# Patient Record
Sex: FEMALE | Race: WHITE | NOT HISPANIC OR LATINO | Employment: OTHER | URBAN - METROPOLITAN AREA
[De-identification: names, ages, dates, MRNs, and addresses within clinical notes are randomized per-mention and may not be internally consistent; named-entity substitution may affect disease eponyms.]

---

## 2019-05-29 LAB
CHOL/HDLC RATIO: 3.1
CHOLESTEROL, TOTAL: 199
HDLC SERPL-MCNC: 64 MG/DL (ref 35–70)
LDLC SERPL CALC-MCNC: 119 MG/DL
NON HDL CHOL. (LDL+VLDL): 135
TRIGL SERPL-MCNC: 69 MG/DL (ref 40–160)

## 2019-12-26 ENCOUNTER — TELEPHONE (OUTPATIENT)
Dept: PODIATRY | Facility: CLINIC | Age: 84
End: 2019-12-26

## 2019-12-26 ENCOUNTER — OFFICE VISIT (OUTPATIENT)
Dept: URGENT CARE | Facility: CLINIC | Age: 84
End: 2019-12-26
Payer: MEDICARE

## 2019-12-26 VITALS
OXYGEN SATURATION: 99 % | HEART RATE: 92 BPM | TEMPERATURE: 97 F | WEIGHT: 112 LBS | HEIGHT: 63 IN | SYSTOLIC BLOOD PRESSURE: 142 MMHG | RESPIRATION RATE: 16 BRPM | BODY MASS INDEX: 19.84 KG/M2 | DIASTOLIC BLOOD PRESSURE: 85 MMHG

## 2019-12-26 DIAGNOSIS — S81.802A OPEN WOUND OF LEFT LOWER LEG, INITIAL ENCOUNTER: ICD-10-CM

## 2019-12-26 DIAGNOSIS — L03.116 CELLULITIS OF LEFT LOWER EXTREMITY: Primary | ICD-10-CM

## 2019-12-26 PROCEDURE — 99204 PR OFFICE/OUTPT VISIT, NEW, LEVL IV, 45-59 MIN: ICD-10-PCS | Mod: S$GLB,,, | Performed by: PHYSICIAN ASSISTANT

## 2019-12-26 PROCEDURE — 99204 OFFICE O/P NEW MOD 45 MIN: CPT | Mod: S$GLB,,, | Performed by: PHYSICIAN ASSISTANT

## 2019-12-26 RX ORDER — MULTIVITAMIN
1 TABLET ORAL DAILY
COMMUNITY

## 2019-12-26 RX ORDER — DOXYCYCLINE HYCLATE 100 MG
100 TABLET ORAL 2 TIMES DAILY
Qty: 20 TABLET | Refills: 0 | Status: SHIPPED | OUTPATIENT
Start: 2019-12-26 | End: 2020-01-05

## 2019-12-26 NOTE — TELEPHONE ENCOUNTER
----- Message from Diane Martell sent at 12/26/2019  1:33 PM CST -----  Contact: Patient granddaughter Jennie   Type:  Same Day Appointment Request    Caller is requesting a same day appointment.  Caller declined first available appointment listed below.      Name of Caller: Jennie    When is the first available appointment?  1/15/19  Symptoms:Cellulitis of left lower extremity Open wound of left lower leg, initial encounter, Wound Care   Best Call Back Number: [phone]  Additional Information: Jennie is requesting a same day appt 12/26/19 for pt. See referral on chart from Urgent Care.  Please call and advise.

## 2019-12-26 NOTE — TELEPHONE ENCOUNTER
Informed Jennie that we have nothing available today and no Dr tomorrow.  Appointment made at [facility] with Dr Fair for Monday per request.

## 2019-12-26 NOTE — PROGRESS NOTES
"Subjective:       Patient ID: Summer Ch is a 87 y.o. female.    Vitals:  height is 5' 3" (1.6 m) and weight is 50.8 kg (112 lb). Her tympanic temperature is 97 °F (36.1 °C). Her blood pressure is 142/85 (abnormal) and her pulse is 92. Her respiration is 16 and oxygen saturation is 99%.     Chief Complaint: Wound Infection    Patient wound came around two weeks ago.  She has had the shingles and adema.  She was treated for the shingles but is having some other symptoms that have not been treated.  Her granddaughter is concerned this may be a symptom.    Rash   This is a new problem. The current episode started 1 to 4 weeks ago. The affected locations include the left lower leg. The rash is characterized by pain, redness, swelling, scaling and draining. She was exposed to nothing. Pertinent negatives include no cough, fever or sore throat.       Constitution: Negative for chills and fever.   HENT: Negative for facial swelling and sore throat.    Neck: Negative for painful lymph nodes.   Eyes: Negative for eye itching and eyelid swelling.   Respiratory: Negative for cough.    Musculoskeletal: Positive for pain. Negative for joint pain and joint swelling.   Skin: Positive for color change, wound and abscess. Negative for pale, rash, abrasion, laceration, lesion, skin thickening/induration, puncture wound, erythema, bruising, avulsion and hives.   Allergic/Immunologic: Negative for environmental allergies, immunocompromised state and hives.   Hematologic/Lymphatic: Negative for swollen lymph nodes.       Objective:          Physical Exam   Constitutional: She is oriented to person, place, and time. She appears well-developed and well-nourished. She is cooperative.  Non-toxic appearance. She does not appear ill. No distress.   HENT:   Head: Normocephalic and atraumatic.   Right Ear: Hearing, tympanic membrane, external ear and ear canal normal.   Left Ear: Hearing, tympanic membrane, external ear and ear canal " normal.   Nose: Nose normal. No mucosal edema, rhinorrhea or nasal deformity. No epistaxis. Right sinus exhibits no maxillary sinus tenderness and no frontal sinus tenderness. Left sinus exhibits no maxillary sinus tenderness and no frontal sinus tenderness.   Mouth/Throat: Uvula is midline, oropharynx is clear and moist and mucous membranes are normal. No trismus in the jaw. Normal dentition. No uvula swelling. No posterior oropharyngeal erythema.   Eyes: Conjunctivae and lids are normal. Right eye exhibits no discharge. Left eye exhibits no discharge. No scleral icterus.   Neck: Trachea normal, normal range of motion, full passive range of motion without pain and phonation normal. Neck supple.   Cardiovascular: Normal rate, regular rhythm, normal heart sounds, intact distal pulses and normal pulses.   Pulmonary/Chest: Effort normal and breath sounds normal. No respiratory distress.   Abdominal: Soft. Normal appearance and bowel sounds are normal. She exhibits no distension, no pulsatile midline mass and no mass. There is no tenderness.   Musculoskeletal: Normal range of motion. She exhibits no edema or deformity.        Left lower leg: She exhibits tenderness and swelling (with cellulitis and open wound (see image) ).        Legs:  Neurological: She is alert and oriented to person, place, and time. She exhibits normal muscle tone. Coordination normal.   Skin: Skin is warm, dry, intact, not diaphoretic and not pale. erythema  Psychiatric: She has a normal mood and affect. Her speech is normal and behavior is normal. Judgment and thought content normal. Cognition and memory are normal.   Nursing note and vitals reviewed.        Assessment:       1. Cellulitis of left lower extremity    2. Open wound of left lower leg, initial encounter        Plan:         Cellulitis of left lower extremity  -     doxycycline (VIBRA-TABS) 100 MG tablet; Take 1 tablet (100 mg total) by mouth 2 (two) times daily. Take as directed  until bottle is empty for 10 days  Dispense: 20 tablet; Refill: 0  -     Ambulatory referral to Wound Clinic    Open wound of left lower leg, initial encounter  -     doxycycline (VIBRA-TABS) 100 MG tablet; Take 1 tablet (100 mg total) by mouth 2 (two) times daily. Take as directed until bottle is empty for 10 days  Dispense: 20 tablet; Refill: 0  -     Ambulatory referral to Wound Clinic    to wound care. Grand daughter in medical school, offering close observation until new PCP appointment. To ED with worsening symptoms.     Patient Instructions     Cellulitis  Cellulitis is an infection of the deep layers of skin. A break in the skin, such as a cut or scratch, can let bacteria under the skin. If the bacteria get to deep layers of the skin, it can be serious. If not treated, cellulitis can get into the bloodstream and lymph nodes. The infection can then spread throughout the body. This causes serious illness.  Cellulitis causes the affected skin to become red, swollen, warm, and sore. The reddened areas have a visible border. An open sore may leak fluid (pus). You may have a fever, chills, and pain.  Cellulitis is treated with antibiotics taken for 7 to 10 days. An open sore may be cleaned and covered with cool wet gauze. Symptoms should get better 1 to 2 days after treatment is started. Make sure to take all the antibiotics for the full number of days until they are gone. Keep taking the medicine even if your symptoms go away.  Home care  Follow these tips:  · Limit the use of the part of your body with cellulitis.   · If the infection is on your leg, keep your leg raised while sitting. This will help to reduce swelling.  · Take all of the antibiotic medicine exactly as directed until it is gone. Do not miss any doses, especially during the first 7 days. Dont stop taking the medicine when your symptoms get better.  · Keep the affected area clean and dry.  · Wash your hands with soap and warm water before and  after touching your skin. Anyone else who touches your skin should also wash his or her hands. Don't share towels.  Follow-up care  Follow up with your healthcare provider, or as advised. If your infection does not go away on the first antibiotic, your healthcare provider will prescribe a different one.  When to seek medical advice  Call your healthcare provider right away if any of these occur:  · Red areas that spread  · Swelling or pain that gets worse  · Fluid leaking from the skin (pus)  · Fever higher of 100.4º F (38.0º C) or higher after 2 days on antibiotics  Date Last Reviewed: 9/1/2016 © 2000-2017 [employer]. [address] [place] [address], [place] [address]. All rights reserved. This information is not intended as a substitute for professional medical care. Always follow your healthcare professional's instructions.       If not allergic,take tylenol (acetominophen) for fever control, chills, or body aches every 4 hours. Do not exceed 4000 mg/ day.If not allergic, take Motrin (Ibuprofen) every 4 hours for fever, chills, pain or inflammation. Do not exceed 2400 mg/day. You can alternate taking tylenol and motrin.    If you were prescribed a narcotic medication, do not drive or operate heavy equipment or machinery while taking these medications.  You must understand that you've received an Urgent Care treatment only and that you may be released before all your medical problems are known or treated. You, the patient, will arrange for follow up care as instructed.  Follow up with your PCP or specialty clinic as directed in the next 1-2 weeks if not improved or as needed.  You can call [phone] to schedule an appointment with the appropriate provider.  If your condition worsens we recommend that you receive another evaluation at the emergency room immediately or contact your primary medical clinics after hours call service to discuss your concerns.    Please return here or go to the Emergency  Department for any concerns or worsening of condition.    If you have been referred to another provider and wish to call to check on the status of your referral, please call [facility] Scheduling at [phone]

## 2019-12-29 ENCOUNTER — TELEPHONE (OUTPATIENT)
Dept: URGENT CARE | Facility: CLINIC | Age: 84
End: 2019-12-29

## 2019-12-29 NOTE — TELEPHONE ENCOUNTER
Patient states her leg is doing a little better.  She is going to see her new PCP tomorrow and she has another appointment on Tuesday

## 2019-12-30 ENCOUNTER — OFFICE VISIT (OUTPATIENT)
Dept: PODIATRY | Facility: CLINIC | Age: 84
End: 2019-12-30
Payer: MEDICARE

## 2019-12-30 VITALS
BODY MASS INDEX: 19.84 KG/M2 | DIASTOLIC BLOOD PRESSURE: 68 MMHG | SYSTOLIC BLOOD PRESSURE: 138 MMHG | WEIGHT: 112 LBS | HEIGHT: 63 IN

## 2019-12-30 DIAGNOSIS — L97.921 ULCER OF LEFT LOWER LEG, LIMITED TO BREAKDOWN OF SKIN: Primary | ICD-10-CM

## 2019-12-30 DIAGNOSIS — I83.222 VARICOSE VEINS OF LEFT LOWER EXTREMITY WITH INFLAMMATION, WITH ULCER OF CALF LIMITED TO BREAKDOWN OF SKIN: ICD-10-CM

## 2019-12-30 DIAGNOSIS — L97.221 VARICOSE VEINS OF LEFT LOWER EXTREMITY WITH INFLAMMATION, WITH ULCER OF CALF LIMITED TO BREAKDOWN OF SKIN: ICD-10-CM

## 2019-12-30 PROCEDURE — 99203 OFFICE O/P NEW LOW 30 MIN: CPT | Mod: S$GLB,,, | Performed by: PODIATRIST

## 2019-12-30 PROCEDURE — 1101F PR PT FALLS ASSESS DOC 0-1 FALLS W/OUT INJ PAST YR: ICD-10-PCS | Mod: S$GLB,,, | Performed by: PODIATRIST

## 2019-12-30 PROCEDURE — 1101F PT FALLS ASSESS-DOCD LE1/YR: CPT | Mod: S$GLB,,, | Performed by: PODIATRIST

## 2019-12-30 PROCEDURE — 1159F PR MEDICATION LIST DOCUMENTED IN MEDICAL RECORD: ICD-10-PCS | Mod: S$GLB,,, | Performed by: PODIATRIST

## 2019-12-30 PROCEDURE — 1125F AMNT PAIN NOTED PAIN PRSNT: CPT | Mod: S$GLB,,, | Performed by: PODIATRIST

## 2019-12-30 PROCEDURE — 99203 PR OFFICE/OUTPT VISIT, NEW, LEVL III, 30-44 MIN: ICD-10-PCS | Mod: S$GLB,,, | Performed by: PODIATRIST

## 2019-12-30 PROCEDURE — 1159F MED LIST DOCD IN RCRD: CPT | Mod: S$GLB,,, | Performed by: PODIATRIST

## 2019-12-30 PROCEDURE — 1125F PR PAIN SEVERITY QUANTIFIED, PAIN PRESENT: ICD-10-PCS | Mod: S$GLB,,, | Performed by: PODIATRIST

## 2019-12-30 RX ORDER — FUROSEMIDE 20 MG/1
TABLET ORAL
Refills: 3 | COMMUNITY
Start: 2019-12-02 | End: 2019-12-31

## 2019-12-30 RX ORDER — GABAPENTIN 100 MG/1
CAPSULE ORAL
COMMUNITY
Start: 2019-12-26 | End: 2020-01-27 | Stop reason: SDUPTHER

## 2019-12-30 RX ORDER — DORZOLAMIDE HCL 20 MG/ML
1 SOLUTION/ DROPS OPHTHALMIC 3 TIMES DAILY
Refills: 6 | COMMUNITY
Start: 2019-11-19

## 2019-12-30 RX ORDER — BIMATOPROST 0.1 MG/ML
SOLUTION/ DROPS OPHTHALMIC
COMMUNITY
Start: 2019-11-30

## 2019-12-30 RX ORDER — ACYCLOVIR 800 MG/1
TABLET ORAL
Refills: 2 | COMMUNITY
Start: 2019-10-22 | End: 2019-12-31

## 2019-12-30 NOTE — LETTER
December 30, 2019      Jayden Minor PA-C  [address] [facility] [address]           [facility] Podiat[facility]  [address]  [facility] [address]  Phone: [phone]  Fax: [phone]          Patient: Summer Ch   MR Number: [id number]   YOB: 1932   Date of Visit: 12/30/2019       Dear Jayden Minor:    Thank you for referring Summer Ch to me for evaluation. Attached you will find relevant portions of my assessment and plan of care.    If you have questions, please do not hesitate to call me. I look forward to following Summer Ch along with you.    Sincerely,    Eugene Fair, BYRON    Enclosure  CC:  No Recipients    If you would like to receive this communication electronically, please contact [email or web address] or [phone] to request more information on [employer] Link access.    For providers and/or their staff who would like to refer a patient to [facility], please contact us through our one-stop-shop provider referral line, [facility], at [phone].    If you feel you have received this communication in error or would no longer like to receive these types of communications, please e-mail [email or web address]

## 2019-12-30 NOTE — PROGRESS NOTES
[address] [place] [address]  Phone: [phone]   [phone]    Patient's PCP:Leonila Sam MD (Inactive)  Referring Provider: Jayden Minor    Subjective:      Chief Complaint:: Wound Check (lower left leg)    NAWAF Ch is a 87 y.o. female who presents with a complaint of wound to left lower limb lasting for several weeks. Onset of the symptoms was pain.  Current symptoms include open wound, scabbing, inflamation.  Aggravating factors are touching. Symptoms have decreased.Treatment to date have included Antibiotics, daily dressing changes,  Patients rates pain 4/10 on pain scale.      Vitals:    12/30/19 1041   BP: 138/68     Shoe Size: 7    History reviewed. No pertinent surgical history.  History reviewed. No pertinent past medical history.  History reviewed. No pertinent family history.     Social History:   Marital Status:   Alcohol History:  reports that she drank alcohol.  Tobacco History:  reports that she has never smoked. She does not have any smokeless tobacco history on file.  Drug History:  reports that she has current or past drug history.    Review of patient's allergies indicates:  No Known Allergies    Current Outpatient Medications   Medication Sig Dispense Refill    acyclovir (ZOVIRAX) 800 MG Tab   2    collagenase (SANTYL) ointment Apply topically once daily. 90 g 2    dorzolamide (TRUSOPT) 2 % ophthalmic solution   6    doxycycline (VIBRA-TABS) 100 MG tablet Take 1 tablet (100 mg total) by mouth 2 (two) times daily. Take as directed until bottle is empty for 10 days 20 tablet 0    furosemide (LASIX) 20 MG tablet   3    gabapentin (NEURONTIN) 100 MG capsule       LUMIGAN 0.01 % Drop       multivitamin (THERAGRAN) per tablet Take 1 tablet by mouth once daily.       No current facility-administered medications for this visit.        Review of Systems      Objective:        Physical Exam:   Foot Exam    General  General Appearance:  appears stated age and healthy   Orientation: alert and oriented to person, place, and time   Affect: appropriate   Gait: antalgic       Left Foot/Ankle      Inspection and Palpation  Skin Exam: ulcer (Grade 2 superficial venous stasis ulcers x3 posterior left lower leg.  No signs of infection.);     Neurovascular  Dorsalis pedis: 1+  Posterior tibial: 1+          Physical Exam   Cardiovascular:   Pulses:       Dorsalis pedis pulses are 1+ on the left side.        Posterior tibial pulses are 1+ on the left side.   Musculoskeletal:        Legs:  Feet:   Left Foot:   Skin Integrity: Positive for ulcer (Grade 2 superficial venous stasis ulcers x3 posterior left lower leg.  No signs of infection.).       Imaging:            Assessment:       1. Ulcer of left lower leg, limited to breakdown of skin    2. 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  Dispense: 90 g; Refill: 2  -     collagenase (SANTYL) ointment; Apply topically once daily.  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    I discussed venous incompetency and sequela of edema, skin pigmentation with hemosiderin deposits, potential ulcer formation.  I discussed compression hose, elevation and possible vascular consult.    Patient's family's ask in for referral to wound care clinic in the [facility].  I am referring her to Dr. Walker  At the  [facility] wound care center and I am giving her the name of Dr. Wellington Reid.  She return to see me as  needed.  Follow up for Pt will f/.u with .    Procedures - None    Counseling:     I provided patient education verbally regarding:   Patient diagnosis, treatment options, as well as alternatives, risks, and benefits.     This note was created using Dragon voice recognition software that occasionally misinterpreted phrases or words.

## 2019-12-31 ENCOUNTER — LAB VISIT (OUTPATIENT)
Dept: LAB | Facility: HOSPITAL | Age: 84
End: 2019-12-31
Attending: INTERNAL MEDICINE
Payer: MEDICARE

## 2019-12-31 ENCOUNTER — OFFICE VISIT (OUTPATIENT)
Dept: FAMILY MEDICINE | Facility: CLINIC | Age: 84
End: 2019-12-31
Payer: MEDICARE

## 2019-12-31 VITALS
HEART RATE: 70 BPM | RESPIRATION RATE: 16 BRPM | WEIGHT: 99.63 LBS | SYSTOLIC BLOOD PRESSURE: 152 MMHG | HEIGHT: 63 IN | TEMPERATURE: 99 F | BODY MASS INDEX: 17.65 KG/M2 | DIASTOLIC BLOOD PRESSURE: 68 MMHG

## 2019-12-31 DIAGNOSIS — H49.9: ICD-10-CM

## 2019-12-31 DIAGNOSIS — G93.9 LESION OF BRAIN: Primary | ICD-10-CM

## 2019-12-31 DIAGNOSIS — I49.9 IRREGULAR HEART RHYTHM: ICD-10-CM

## 2019-12-31 DIAGNOSIS — L98.9 NON-HEALING SKIN LESION: ICD-10-CM

## 2019-12-31 DIAGNOSIS — G93.9 LESION OF BRAIN: ICD-10-CM

## 2019-12-31 DIAGNOSIS — R60.0 BILATERAL LOWER EXTREMITY EDEMA: ICD-10-CM

## 2019-12-31 DIAGNOSIS — Z85.828 HISTORY OF BASAL CELL CARCINOMA: ICD-10-CM

## 2019-12-31 DIAGNOSIS — I83.222 VARICOSE VEINS OF LEFT LOWER EXTREMITY WITH INFLAMMATION, WITH ULCER OF CALF LIMITED TO BREAKDOWN OF SKIN: ICD-10-CM

## 2019-12-31 DIAGNOSIS — R03.0 ELEVATED BP WITHOUT DIAGNOSIS OF HYPERTENSION: ICD-10-CM

## 2019-12-31 DIAGNOSIS — L97.221 VARICOSE VEINS OF LEFT LOWER EXTREMITY WITH INFLAMMATION, WITH ULCER OF CALF LIMITED TO BREAKDOWN OF SKIN: ICD-10-CM

## 2019-12-31 DIAGNOSIS — L97.921 LEG ULCER, LEFT, LIMITED TO BREAKDOWN OF SKIN: ICD-10-CM

## 2019-12-31 DIAGNOSIS — D48.7 NEOPLASM OF UNCERTAIN BEHAVIOR OF OTHER SPECIFIED SITES: ICD-10-CM

## 2019-12-31 LAB
ALBUMIN SERPL BCP-MCNC: 3.9 G/DL (ref 3.5–5.2)
ALP SERPL-CCNC: 83 U/L (ref 55–135)
ALT SERPL W/O P-5'-P-CCNC: 17 U/L (ref 10–44)
ANION GAP SERPL CALC-SCNC: 7 MMOL/L (ref 8–16)
AST SERPL-CCNC: 21 U/L (ref 10–40)
BASOPHILS # BLD AUTO: 0.08 K/UL (ref 0–0.2)
BASOPHILS NFR BLD: 0.9 % (ref 0–1.9)
BILIRUB SERPL-MCNC: 0.4 MG/DL (ref 0.1–1)
BUN SERPL-MCNC: 14 MG/DL (ref 8–23)
CALCIUM SERPL-MCNC: 9.4 MG/DL (ref 8.7–10.5)
CHLORIDE SERPL-SCNC: 106 MMOL/L (ref 95–110)
CO2 SERPL-SCNC: 27 MMOL/L (ref 23–29)
CREAT SERPL-MCNC: 0.8 MG/DL (ref 0.5–1.4)
CRP SERPL-MCNC: 0.4 MG/L (ref 0–8.2)
DIFFERENTIAL METHOD: ABNORMAL
EOSINOPHIL # BLD AUTO: 0.3 K/UL (ref 0–0.5)
EOSINOPHIL NFR BLD: 3.7 % (ref 0–8)
ERYTHROCYTE [DISTWIDTH] IN BLOOD BY AUTOMATED COUNT: 12.6 % (ref 11.5–14.5)
ERYTHROCYTE [SEDIMENTATION RATE] IN BLOOD BY WESTERGREN METHOD: 13 MM/HR (ref 0–36)
EST. GFR  (AFRICAN AMERICAN): >60 ML/MIN/1.73 M^2
EST. GFR  (NON AFRICAN AMERICAN): >60 ML/MIN/1.73 M^2
GLUCOSE SERPL-MCNC: 95 MG/DL (ref 70–110)
HCT VFR BLD AUTO: 41.5 % (ref 37–48.5)
HGB BLD-MCNC: 12.6 G/DL (ref 12–16)
IMM GRANULOCYTES # BLD AUTO: 0.02 K/UL (ref 0–0.04)
IMM GRANULOCYTES NFR BLD AUTO: 0.2 % (ref 0–0.5)
LYMPHOCYTES # BLD AUTO: 2.1 K/UL (ref 1–4.8)
LYMPHOCYTES NFR BLD: 22.5 % (ref 18–48)
MCH RBC QN AUTO: 30.8 PG (ref 27–31)
MCHC RBC AUTO-ENTMCNC: 30.4 G/DL (ref 32–36)
MCV RBC AUTO: 102 FL (ref 82–98)
MONOCYTES # BLD AUTO: 0.8 K/UL (ref 0.3–1)
MONOCYTES NFR BLD: 8.2 % (ref 4–15)
NEUTROPHILS # BLD AUTO: 5.9 K/UL (ref 1.8–7.7)
NEUTROPHILS NFR BLD: 64.5 % (ref 38–73)
NRBC BLD-RTO: 0 /100 WBC
PATH REV BLD -IMP: NORMAL
PLATELET # BLD AUTO: 327 K/UL (ref 150–350)
PMV BLD AUTO: 9.4 FL (ref 9.2–12.9)
POTASSIUM SERPL-SCNC: 4.5 MMOL/L (ref 3.5–5.1)
PROT SERPL-MCNC: 7.1 G/DL (ref 6–8.4)
RBC # BLD AUTO: 4.09 M/UL (ref 4–5.4)
SODIUM SERPL-SCNC: 140 MMOL/L (ref 136–145)
WBC # BLD AUTO: 9.16 K/UL (ref 3.9–12.7)

## 2019-12-31 PROCEDURE — 93010 ELECTROCARDIOGRAM REPORT: CPT | Mod: S$GLB,,, | Performed by: INTERNAL MEDICINE

## 2019-12-31 PROCEDURE — 36415 COLL VENOUS BLD VENIPUNCTURE: CPT | Mod: PN

## 2019-12-31 PROCEDURE — 99999 PR PBB SHADOW E&M-EST. PATIENT-LVL V: ICD-10-PCS | Mod: PBBFAC,,, | Performed by: INTERNAL MEDICINE

## 2019-12-31 PROCEDURE — 1126F PR PAIN SEVERITY QUANTIFIED, NO PAIN PRESENT: ICD-10-PCS | Mod: S$GLB,,, | Performed by: INTERNAL MEDICINE

## 2019-12-31 PROCEDURE — 1159F MED LIST DOCD IN RCRD: CPT | Mod: S$GLB,,, | Performed by: INTERNAL MEDICINE

## 2019-12-31 PROCEDURE — 85652 RBC SED RATE AUTOMATED: CPT

## 2019-12-31 PROCEDURE — 99499 RISK ADDL DX/OHS AUDIT: ICD-10-PCS | Mod: S$GLB,,, | Performed by: INTERNAL MEDICINE

## 2019-12-31 PROCEDURE — 85060 PATHOLOGIST REVIEW: ICD-10-PCS | Mod: ,,, | Performed by: PATHOLOGY

## 2019-12-31 PROCEDURE — 99205 OFFICE O/P NEW HI 60 MIN: CPT | Mod: S$GLB,,, | Performed by: INTERNAL MEDICINE

## 2019-12-31 PROCEDURE — 86140 C-REACTIVE PROTEIN: CPT

## 2019-12-31 PROCEDURE — 99999 PR PBB SHADOW E&M-EST. PATIENT-LVL V: CPT | Mod: PBBFAC,,, | Performed by: INTERNAL MEDICINE

## 2019-12-31 PROCEDURE — 80053 COMPREHEN METABOLIC PANEL: CPT

## 2019-12-31 PROCEDURE — 93005 ELECTROCARDIOGRAM TRACING: CPT | Mod: S$GLB,,, | Performed by: INTERNAL MEDICINE

## 2019-12-31 PROCEDURE — 1101F PT FALLS ASSESS-DOCD LE1/YR: CPT | Mod: CPTII,S$GLB,, | Performed by: INTERNAL MEDICINE

## 2019-12-31 PROCEDURE — 1126F AMNT PAIN NOTED NONE PRSNT: CPT | Mod: S$GLB,,, | Performed by: INTERNAL MEDICINE

## 2019-12-31 PROCEDURE — 1159F PR MEDICATION LIST DOCUMENTED IN MEDICAL RECORD: ICD-10-PCS | Mod: S$GLB,,, | Performed by: INTERNAL MEDICINE

## 2019-12-31 PROCEDURE — 99205 PR OFFICE/OUTPT VISIT, NEW, LEVL V, 60-74 MIN: ICD-10-PCS | Mod: S$GLB,,, | Performed by: INTERNAL MEDICINE

## 2019-12-31 PROCEDURE — 85060 BLOOD SMEAR INTERPRETATION: CPT | Mod: ,,, | Performed by: PATHOLOGY

## 2019-12-31 PROCEDURE — 93010 EKG 12-LEAD: ICD-10-PCS | Mod: S$GLB,,, | Performed by: INTERNAL MEDICINE

## 2019-12-31 PROCEDURE — 99499 UNLISTED E&M SERVICE: CPT | Mod: S$GLB,,, | Performed by: INTERNAL MEDICINE

## 2019-12-31 PROCEDURE — 1101F PR PT FALLS ASSESS DOC 0-1 FALLS W/OUT INJ PAST YR: ICD-10-PCS | Mod: CPTII,S$GLB,, | Performed by: INTERNAL MEDICINE

## 2019-12-31 PROCEDURE — 85025 COMPLETE CBC W/AUTO DIFF WBC: CPT

## 2019-12-31 PROCEDURE — 93005 EKG 12-LEAD: ICD-10-PCS | Mod: S$GLB,,, | Performed by: INTERNAL MEDICINE

## 2019-12-31 RX ORDER — TIMOLOL MALEATE 5 MG/ML
1 SOLUTION/ DROPS OPHTHALMIC DAILY
Refills: 6 | COMMUNITY
Start: 2019-11-30

## 2019-12-31 NOTE — PROGRESS NOTES
"Assessment and Plan:    1. Lesion of brain  - Ambulatory referral to Neurosurgery  - CBC auto differential; Future  - Comprehensive metabolic panel; Future  - Sedimentation rate; Future  - C-reactive protein; Future  - Pathologist Interpretation Differential; Future  2. 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 Yes; Future  3. Eye muscle paralysis, left    Patient presenting with history of questionable diagnosis of shingles and ptosis/eye muscle paralysis of unclear duration but at least present for the last month. Noted on recent MRI to have "Insinuating avidly enhancing lesion within the left cavernous sinus and Meckel's cave extending through the left orbital apex into the poster aspect of the orbit. Main differential considerations include inflammatory process such as idiopathic orbital inflammation/Arlette Haas syndrome perineural spread of metastatic disease or lymphoma.  Comparison with prior imaging if available would be helpful."    This MRI result had not previously been discussed with patient or family by ordering provider. I personally left message with Dr. Carias's office today to discuss result. I discussed her symptoms and MRI result with Neurologist during this visit. Discussed possible steroids if this is Arlette Haas syndrome, but would hold off awaiting possible need for biopsy. Case will be discussed with [facility], patient advised to call us if they had not heard within a week.     Will start additional workup with labs as above. No known history of malignancy elsewhere for this to be metastasis, but has not really had any cancer screenings recently. Will await additional workup pending Neuro/Oncology evaluation.     4. Leg ulcer, left, limited to breakdown of skin  5. Varicose veins of left lower extremity with inflammation, with ulcer of calf limited to breakdown of skin  Wound care per LPN in clinic today. Has scheduled apt with  " Sandip later this week. Workup of edema as below.     6. Bilateral lower extremity edema  Noted to have bilateral LE edema in the last year, recently diagnosed with venous stasis ulcer. No known cause of bilateral edema, but states this was never worked up. No known renal disease, but not clear if this has been monitored. No known hepatic disease, but history of alcohol dependence so this is possible. No known heart disease, but at 87 with elevated systolic BP diastolic dysfunction is likely. Will obtain echo. Possible it is just venous insufficiency, but will evaluate for other causes. Follow up with me and Cardiology after echo.  - Echo Color Flow Doppler? Yes; Future  - Ambulatory referral to Cardiology    7. Irregular heart rhythm  On exam noted to have irregularly irregular rhythm. Per patient and family, had never been diagnosed with A-fib or any other arrhythmia. On EKG in clinic, she is not in A-fib, rather sinus with significant arrhythmia. No symptoms related to this, was incidentally noted. Will obtain echo as planned to evaluate for causes of bilateral LE edema and will have patient see Cardiology (especially with possible need for treatment related to brain lesion).   - IN OFFICE EKG 12-LEAD (to Muse)  - Echo Color Flow Doppler? Yes; Future  - Ambulatory referral to Cardiology    8. Elevated BP without diagnosis of hypertension  Elevated today and borderline elevated on previous visits. No previous diagnosis of HTN and never on medications, but family reports concern that previous PCP may not have been closely monitoring this. Will readdress at future visits once we have more information.  - Comprehensive metabolic panel; Future  - Echo Color Flow Doppler? Yes; Future  - Ambulatory referral to Cardiology    9. History of basal cell carcinoma  10. Non-healing skin lesion  History of numerous basal cell carcinomas removed reportedly by Dr. Lemon. Has never seen Dermatology. Has non-healing ulcer on  left side of face as pictured. Discussed that we will eventually need her to see Dermatology, but this can wait for now.     Visit time 11:20-1:15 (115 minutes) and additional time for charting with >50% of this time spend in direct discussion, consultation, and review. Multiple diagnoses including plan of care discussed with patient and family. Multiple questions asked and answered. Chart reviewed in full. Outside MRI result reviewed and discussed with patient/family. EKG interpreted in clinic and follow up plan documented. Lab work ordered. All medications reviewed and addressed.   ______________________________________________________________________  Subjective:    Chief Complaint:  Establish care, multiple complaints.    HPI:  Summer is a 87 y.o. year old woman here to establish care. She is new to [facility], she is a former patient of Dr. Chris Lemon.    She is here today accompanied by her daughter Delicia and granddaughter Ashley (MS1 at [facility] [employer]).     One of the concerns they wanted to discuss was that been recently diagnosed with shingles, but they are not sure if this is the whole story. She had first noticed left sided upper facial pain in July. She ended up having a biopsy of a facial lesion (not sure if this was rash vs a different lesion) sometime in July, told it was not cancer, but they do not know if they were told anything else about the biopsy. Several months ago she started to notice drooping of her left eyelid, so went to see her optometrist, who had referred her to Dr. Carias. She is not sure of what all was done initially, at some point was put on acyclovir which she is no longer taking, and at some point she was put on low dose gabapentin which she is still taking, but not sure if this is helping. On 12/5, Dr. Carias ordered MRI brain and orbit to work up sixth nerve palsy and partial third nerve palsy, but this was not completed until 12/27 due to waiting on  insurance coverage per patient report. They have not yet received any results from this yet. At this point she reports that she is unable to open her left eyelid or move her left eye. She states she has some minimal pain in area of left upper face, but not clear if this is the skin or pain deeper down in the orbital area.    Family reports that she has had some minor cognitive decline in the last year, but nothing abrupt. They are not sure if this is mainly due to hearing loss.    She has also recently been dealing with ulcer of left leg. She first noticed swelling in both legs roughly a year ago after she had the fall with tibial plateau fracture. She was initially diagnosed with cellulitis of left lower extremity at urgent care visit on 12/26, treated with doxycycline. States she had not noticed any ulcer on her leg prior to this time. She was subsequently seen by podiatry yesterday for left leg ulcer and was referred to wound care. Has scheduled wound care apt.     She reports that she was always a healthy person, very active, but had a fall about 1 year ago with a tibia fracture and her health has gone downhill since then. She was inactive for 2 months at the time of this fracture. She reportedly had no leg swelling prior to this fall.     She has also been diagnosed with basal cell carcinoma several times since 2009. One large lesion on her back, at least 5 times total has had various BCC and not sure where all of them were. Reports that every time this was diagnosed and treated by Dr. Lemon, has not ever seen a dermatologist.     She reports that she used to be a heavy alcohol user, but has not had any alcohol since ~1999. No history of known complications from this such as liver disease.     Past Medical History:  Past Medical History:   Diagnosis Date    Cancer     skin cancer       Past Surgical History:  Past Surgical History:   Procedure Laterality Date    APPENDECTOMY      HYSTERECTOMY      SKIN  CANCER EXCISION      x5    TONSILLECTOMY         Family History:  Family History   Problem Relation Age of Onset    Alcohol abuse Mother     Stroke Father         Hemorrhagic       Social History:  Social History     Socioeconomic History    Marital status:      Spouse name: Not on file    Number of children: Not on file    Years of education: Not on file    Highest education level: Not on file   Occupational History    Not on file   Social Needs    Financial resource strain: Not on file    Food insecurity:     Worry: Not on file     Inability: Not on file    Transportation needs:     Medical: Not on file     Non-medical: Not on file   Tobacco Use    Smoking status: Never Smoker    Smokeless tobacco: Never Used   Substance and Sexual Activity    Alcohol use: Not Currently    Drug use: Not Currently    Sexual activity: Not Currently   Lifestyle    Physical activity:     Days per week: Not on file     Minutes per session: Not on file    Stress: Not on file   Relationships    Social connections:     Talks on phone: Not on file     Gets together: Not on file     Attends [employer] service: Not on file     Active member of club or organization: Not on file     Attends meetings of clubs or organizations: Not on file     Relationship status: Not on file   Other Topics Concern    Not on file   Social History Narrative    Not on file       Medications:  Current Outpatient Medications on File Prior to Visit   Medication Sig Dispense Refill    acetaminophen (TYLENOL ORAL) Take 1 Dose by mouth 2 (two) times daily as needed.      dorzolamide (TRUSOPT) 2 % ophthalmic solution   6    doxycycline (VIBRA-TABS) 100 MG tablet Take 1 tablet (100 mg total) by mouth 2 (two) times daily. Take as directed until bottle is empty for 10 days 20 tablet 0    gabapentin (NEURONTIN) 100 MG capsule       LUMIGAN 0.01 % Drop       multivitamin (THERAGRAN) per tablet Take 1 tablet by mouth once daily.       "collagenase (SANTYL) ointment Apply topically once daily. (Patient not taking: Reported on 12/31/2019) 90 g 2    timolol maleate 0.5% (TIMOPTIC) 0.5 % Drop   6    [DISCONTINUED] acyclovir (ZOVIRAX) 800 MG Tab   2    [DISCONTINUED] furosemide (LASIX) 20 MG tablet   3     No current facility-administered medications on file prior to visit.        Allergies:  Patient has no known allergies.    Immunizations:    There is no immunization history on file for this patient.    Review of Systems:  Review of Systems   Constitutional: Negative for activity change, appetite change, chills and fever.   HENT: Positive for hearing loss. Negative for trouble swallowing.    Eyes: Positive for visual disturbance. Negative for photophobia and discharge.   Respiratory: Negative for chest tightness and shortness of breath.    Cardiovascular: Positive for leg swelling. Negative for chest pain and palpitations.   Gastrointestinal: Negative for nausea and vomiting.   Endocrine: Negative for polydipsia and polyuria.   Genitourinary: Negative for difficulty urinating and frequency.   Musculoskeletal: Negative for gait problem and myalgias.   Skin: Positive for color change, rash and wound.   Neurological: Positive for facial asymmetry and headaches. Negative for dizziness, seizures, syncope, weakness and light-headedness.   Hematological: Negative for adenopathy. Does not bruise/bleed easily.   Psychiatric/Behavioral: Negative for dysphoric mood. The patient is not nervous/anxious.        Objective:    Vitals:  Vitals:    12/31/19 1132 12/31/19 1235   BP: (!) 162/70 (!) 152/68   Pulse: 70    Resp: 16    Temp: 98.7 °F (37.1 °C)    TempSrc: Oral    Weight: 45.2 kg (99 lb 10.4 oz)    Height: 5' 3" (1.6 m)    PainSc: 0-No pain        Physical Exam   Constitutional: She is oriented to person, place, and time. She appears well-developed and well-nourished. No distress.   HENT:   Mouth/Throat: Oropharynx is clear and moist.   Eyes:   right eye " has normal eye movement and normally reactive to light; left eye has complete ptosis (unable to hold eyelid open at all) and no medial or lateral movement of left eye   Neck: Normal range of motion. No JVD present. No thyromegaly present.   Cardiovascular: Normal rate and intact distal pulses. Exam reveals no gallop and no friction rub.   Murmur (faint systolic murmur at base of heart) heard.  normal rate but irregularly irregular rhythm   Pulmonary/Chest: Effort normal and breath sounds normal. No respiratory distress. She has no wheezes. She has no rales.   Abdominal: Soft. She exhibits no distension.   Musculoskeletal:   1+ pitting edema to bilateral lower extremities up to mid shin, equal bilaterally   Neurological: She is alert and oriented to person, place, and time. A cranial nerve deficit is present.   Skin: Skin is warm and dry. She is not diaphoretic.   3 shallow ulcerations noted to left calf with no surrouding erythema, minimal clear drainage   Psychiatric: She has a normal mood and affect. Her behavior is normal. Judgment and thought content normal.                Data:    MRI Brain/Orbit 12/27/19    Impression       Insinuating avidly enhancing lesion within the left cavernous sinus and Meckel's cave extending through the left orbital apex into the poster aspect of the orbit.  It extends along cranial nerve V2 in the infraorbital foramen with enhancing tissue in the infraorbital soft tissues anterior to the left maxillary sinus.  It also extends along cranial nerve V3 through foramen ovale into the left infratemporal fossa.  Main differential considerations include inflammatory process such as idiopathic orbital inflammation/Arlette Haas syndrome perineural spread of metastatic disease or lymphoma.  Comparison with prior imaging if available would be helpful.  Interval follow-up imaging may be helpful to assess change/response to any treatments.     EKG in clinic (my interpretation): Sinus rhythm with  significant arrhythmia but definite P waves, no ST or T wave changes    Kayla Pereira MD  Internal Medicine

## 2019-12-31 NOTE — PROGRESS NOTES
Pt confirmed, name and . Ulcer x 3, L posterior LE. No sign of infection. Pos for very minimal draining, clear serous fluid. Applied triple antibiotic ointment, 4x4 adaptci and sterile 4x4 gauze. Covered with 4 in roll gauze. Dressing applied is just to keep wound covered until pt gets home or until tomorrow, if pt prefers. Pt and family will continue to dress wound as instructed by Dr Fair until pt sees wound care on Friday.

## 2020-01-02 ENCOUNTER — TELEPHONE (OUTPATIENT)
Dept: URGENT CARE | Facility: CLINIC | Age: 85
End: 2020-01-02

## 2020-01-02 LAB — PATH REV BLD -IMP: NORMAL

## 2020-01-03 ENCOUNTER — TELEPHONE (OUTPATIENT)
Dept: NEUROSURGERY | Facility: CLINIC | Age: 85
End: 2020-01-03

## 2020-01-03 ENCOUNTER — TELEPHONE (OUTPATIENT)
Dept: FAMILY MEDICINE | Facility: CLINIC | Age: 85
End: 2020-01-03

## 2020-01-03 NOTE — TELEPHONE ENCOUNTER
----- Message from Bobbi Martin sent at 1/3/2020  2:24 PM CST -----  Contact: Delicia Medrano (Daughter) [phone]  Delicia Medrano (Daughter) [phone] please call concerning specialty appt on the [facility], the soonest appt was June 2020.  Patient have an mass in her head, they are requesting a sooner appt date, she stated no one called in so she was advised to contact Dr. Pereira per her request.

## 2020-01-06 ENCOUNTER — CLINICAL SUPPORT (OUTPATIENT)
Dept: CARDIOLOGY | Facility: CLINIC | Age: 85
End: 2020-01-06
Attending: INTERNAL MEDICINE
Payer: MEDICARE

## 2020-01-06 ENCOUNTER — TELEPHONE (OUTPATIENT)
Dept: FAMILY MEDICINE | Facility: CLINIC | Age: 85
End: 2020-01-06

## 2020-01-06 VITALS
SYSTOLIC BLOOD PRESSURE: 118 MMHG | HEART RATE: 86 BPM | BODY MASS INDEX: 17.65 KG/M2 | DIASTOLIC BLOOD PRESSURE: 60 MMHG | WEIGHT: 99.63 LBS

## 2020-01-06 DIAGNOSIS — R03.0 ELEVATED BP WITHOUT DIAGNOSIS OF HYPERTENSION: ICD-10-CM

## 2020-01-06 DIAGNOSIS — D48.7 NEOPLASM OF UNCERTAIN BEHAVIOR OF OTHER SPECIFIED SITES: ICD-10-CM

## 2020-01-06 DIAGNOSIS — I49.9 IRREGULAR HEART RHYTHM: ICD-10-CM

## 2020-01-06 DIAGNOSIS — R60.0 BILATERAL LOWER EXTREMITY EDEMA: ICD-10-CM

## 2020-01-06 PROCEDURE — 93306 ECHO (CUPID ONLY): ICD-10-PCS | Mod: S$GLB,,, | Performed by: INTERNAL MEDICINE

## 2020-01-06 PROCEDURE — 99999 PR PBB SHADOW E&M-EST. PATIENT-LVL II: ICD-10-PCS | Mod: PBBFAC,,,

## 2020-01-06 PROCEDURE — 93306 TTE W/DOPPLER COMPLETE: CPT | Mod: S$GLB,,, | Performed by: INTERNAL MEDICINE

## 2020-01-06 PROCEDURE — 99999 PR PBB SHADOW E&M-EST. PATIENT-LVL II: CPT | Mod: PBBFAC,,,

## 2020-01-07 LAB
ASCENDING AORTA: 3.41 CM
AV INDEX (PROSTH): 1.01
AV MEAN GRADIENT: 4 MMHG
AV PEAK GRADIENT: 6 MMHG
AV VALVE AREA: 3.14 CM2
AV VELOCITY RATIO: 0.94
CV ECHO LV RWT: 0.39 CM
DOP CALC AO PEAK VEL: 1.21 M/S
DOP CALC AO VTI: 28.1 CM
DOP CALC LVOT AREA: 3.1 CM2
DOP CALC LVOT DIAMETER: 1.99 CM
DOP CALC LVOT PEAK VEL: 1.14 M/S
DOP CALC LVOT STROKE VOLUME: 88.26 CM3
DOP CALCLVOT PEAK VEL VTI: 28.39 CM
E WAVE DECELERATION TIME: 273.43 MSEC
E/A RATIO: 0.65
E/E' RATIO: 11.33 M/S
ECHO LV POSTERIOR WALL: 0.82 CM (ref 0.6–1.1)
FRACTIONAL SHORTENING: 41 % (ref 28–44)
INTERVENTRICULAR SEPTUM: 0.99 CM (ref 0.6–1.1)
IVRT: 0.13 MSEC
LA MAJOR: 4.33 CM
LA MINOR: 4.73 CM
LA WIDTH: 3.95 CM
LEFT ATRIUM SIZE: 3.45 CM
LEFT ATRIUM VOLUME: 52.37 CM3
LEFT INTERNAL DIMENSION IN SYSTOLE: 2.52 CM (ref 2.1–4)
LEFT VENTRICLE DIASTOLIC VOLUME: 80.35 ML
LEFT VENTRICLE SYSTOLIC VOLUME: 22.79 ML
LEFT VENTRICULAR INTERNAL DIMENSION IN DIASTOLE: 4.24 CM (ref 3.5–6)
LEFT VENTRICULAR MASS: 121.42 G
LV LATERAL E/E' RATIO: 12.75 M/S
LV SEPTAL E/E' RATIO: 10.2 M/S
MV PEAK A VEL: 0.78 M/S
MV PEAK E VEL: 0.51 M/S
PISA TR MAX VEL: 2.92 M/S
PULM VEIN S/D RATIO: 2.94
PV PEAK D VEL: 0.33 M/S
PV PEAK S VEL: 0.97 M/S
RA MAJOR: 3.71 CM
RA PRESSURE: 3 MMHG
RA WIDTH: 3.75 CM
RIGHT VENTRICULAR END-DIASTOLIC DIMENSION: 2.73 CM
SINUS: 2.69 CM
STJ: 2.35 CM
TDI LATERAL: 0.04 M/S
TDI SEPTAL: 0.05 M/S
TDI: 0.05 M/S
TR MAX PG: 34 MMHG
TRICUSPID ANNULAR PLANE SYSTOLIC EXCURSION: 2.39 CM
TV REST PULMONARY ARTERY PRESSURE: 37 MMHG

## 2020-01-07 NOTE — TELEPHONE ENCOUNTER
Spoke with patient's daughter Delicia about all lab results. ESR and CRP negative, making an inflammatory lesion less likely. Other labs unremarkable. Neurosurgery consult scheduled. All questions answered to the best of my ability.

## 2020-01-09 ENCOUNTER — TELEPHONE (OUTPATIENT)
Dept: NEUROSURGERY | Facility: CLINIC | Age: 85
End: 2020-01-09

## 2020-01-09 NOTE — TELEPHONE ENCOUNTER
Called pt. Informed her of appt 1/28. She asked that I call her dtr.    Called Delicia. Discussed appt. Accepted date/time/place.

## 2020-01-22 ENCOUNTER — OFFICE VISIT (OUTPATIENT)
Dept: CARDIOLOGY | Facility: CLINIC | Age: 85
End: 2020-01-22
Payer: MEDICARE

## 2020-01-22 VITALS
HEART RATE: 85 BPM | SYSTOLIC BLOOD PRESSURE: 159 MMHG | WEIGHT: 101.63 LBS | BODY MASS INDEX: 18.01 KG/M2 | HEIGHT: 63 IN | DIASTOLIC BLOOD PRESSURE: 79 MMHG

## 2020-01-22 DIAGNOSIS — R60.0 BILATERAL LOWER EXTREMITY EDEMA: Primary | ICD-10-CM

## 2020-01-22 PROCEDURE — 1101F PR PT FALLS ASSESS DOC 0-1 FALLS W/OUT INJ PAST YR: ICD-10-PCS | Mod: CPTII,S$GLB,, | Performed by: INTERNAL MEDICINE

## 2020-01-22 PROCEDURE — 1101F PT FALLS ASSESS-DOCD LE1/YR: CPT | Mod: CPTII,S$GLB,, | Performed by: INTERNAL MEDICINE

## 2020-01-22 PROCEDURE — 1126F PR PAIN SEVERITY QUANTIFIED, NO PAIN PRESENT: ICD-10-PCS | Mod: S$GLB,,, | Performed by: INTERNAL MEDICINE

## 2020-01-22 PROCEDURE — 1126F AMNT PAIN NOTED NONE PRSNT: CPT | Mod: S$GLB,,, | Performed by: INTERNAL MEDICINE

## 2020-01-22 PROCEDURE — 99204 PR OFFICE/OUTPT VISIT, NEW, LEVL IV, 45-59 MIN: ICD-10-PCS | Mod: S$GLB,,, | Performed by: INTERNAL MEDICINE

## 2020-01-22 PROCEDURE — 99204 OFFICE O/P NEW MOD 45 MIN: CPT | Mod: S$GLB,,, | Performed by: INTERNAL MEDICINE

## 2020-01-22 PROCEDURE — 99999 PR PBB SHADOW E&M-EST. PATIENT-LVL III: ICD-10-PCS | Mod: PBBFAC,,, | Performed by: INTERNAL MEDICINE

## 2020-01-22 PROCEDURE — 1159F MED LIST DOCD IN RCRD: CPT | Mod: S$GLB,,, | Performed by: INTERNAL MEDICINE

## 2020-01-22 PROCEDURE — 1159F PR MEDICATION LIST DOCUMENTED IN MEDICAL RECORD: ICD-10-PCS | Mod: S$GLB,,, | Performed by: INTERNAL MEDICINE

## 2020-01-22 PROCEDURE — 99999 PR PBB SHADOW E&M-EST. PATIENT-LVL III: CPT | Mod: PBBFAC,,, | Performed by: INTERNAL MEDICINE

## 2020-01-22 NOTE — PROGRESS NOTES
Subjective:    Patient ID:  Summer Ch is a 87 y.o. female who presents for evaluation of Consult (ref from pcp)      Problem List Items Addressed This Visit     None      Visit Diagnoses     Bilateral lower extremity edema    -  Primary          HPI    Referred by Dr. Pereira    The patient states that she has had issues with a left leg wound for some time that is currently undergoing wound care.    Recent echocardiogram performed in early January shows preserved ejection fraction with indeterminate diastolic function, but IVC of 3 which would not be consistent with elevated right atrial pressure.    Personal history of heart attack or stroke - None that she is aware of  Family history of heart disease - None that she is aware    Past Medical History:   Diagnosis Date    Cancer     skin cancer       Past Surgical History:   Procedure Laterality Date    APPENDECTOMY      HYSTERECTOMY      SKIN CANCER EXCISION      x5    TONSILLECTOMY         Family History   Problem Relation Age of Onset    Alcohol abuse Mother     Stroke Father         Hemorrhagic       Social History     Socioeconomic History    Marital status:      Spouse name: Not on file    Number of children: Not on file    Years of education: Not on file    Highest education level: Not on file   Occupational History    Not on file   Social Needs    Financial resource strain: Not on file    Food insecurity:     Worry: Not on file     Inability: Not on file    Transportation needs:     Medical: Not on file     Non-medical: Not on file   Tobacco Use    Smoking status: Never Smoker    Smokeless tobacco: Never Used   Substance and Sexual Activity    Alcohol use: Not Currently    Drug use: Not Currently    Sexual activity: Not Currently   Lifestyle    Physical activity:     Days per week: Not on file     Minutes per session: Not on file    Stress: Not on file   Relationships    Social connections:     Talks on phone: Not on file     " Gets together: Not on file     Attends Mandaeism service: Not on file     Active member of club or organization: Not on file     Attends meetings of clubs or organizations: Not on file     Relationship status: Not on file   Other Topics Concern    Not on file   Social History Narrative    Not on file       Review of patient's allergies indicates:  No Known Allergies    Review of Systems   Constitution: Negative for decreased appetite, fever and malaise/fatigue.   Eyes: Negative for blurred vision.   Cardiovascular: Negative for chest pain, dyspnea on exertion, irregular heartbeat and leg swelling.   Respiratory: Negative for cough, hemoptysis, shortness of breath and wheezing.    Endocrine: Negative for cold intolerance and heat intolerance.   Hematologic/Lymphatic: Negative for bleeding problem.   Musculoskeletal: Negative for muscle weakness and myalgias.   Gastrointestinal: Negative for abdominal pain, constipation and diarrhea.   Genitourinary: Negative for bladder incontinence.   Neurological: Negative for dizziness and weakness.   Psychiatric/Behavioral: Negative for depression.        Objective:     Vitals:    01/22/20 1411   BP: (!) 159/79   BP Location: Left arm   Patient Position: Sitting   BP Method: Medium (Automatic)   Pulse: 85   Weight: 46.1 kg (101 lb 10.1 oz)   Height: 5' 3" (1.6 m)        Physical Exam   Constitutional: She is oriented to person, place, and time. She appears well-developed and well-nourished. No distress.   HENT:   Head: Normocephalic and atraumatic.   Neck: Neck supple. No JVD present.   Cardiovascular: Normal rate, regular rhythm and normal heart sounds. Exam reveals no gallop and no friction rub.   No murmur heard.  Pulmonary/Chest: Effort normal and breath sounds normal. No respiratory distress. She has no wheezes. She has no rales.   Abdominal: Soft. Bowel sounds are normal. There is no tenderness. There is no rebound and no guarding.   Musculoskeletal: She exhibits edema. " She exhibits no tenderness.   Neurological: She is alert and oriented to person, place, and time.   Skin: Skin is warm and dry.   Psychiatric: Her behavior is normal.           Current Outpatient Medications on File Prior to Visit   Medication Sig    acetaminophen (TYLENOL ORAL) Take 1 Dose by mouth 2 (two) times daily as needed.    collagenase (SANTYL) ointment Apply topically once daily.    dorzolamide (TRUSOPT) 2 % ophthalmic solution     gabapentin (NEURONTIN) 100 MG capsule     LUMIGAN 0.01 % Drop     multivitamin (THERAGRAN) per tablet Take 1 tablet by mouth once daily.    timolol maleate 0.5% (TIMOPTIC) 0.5 % Drop      No current facility-administered medications on file prior to visit.        Lipid Panel:   No results found for: CHOL, HDL, LDLCALC, TRIG, CHOLHDL      The ASCVD Risk score (Gunjan DC Jr., et al., 2013) failed to calculate for the following reasons:    The 2013 ASCVD risk score is only valid for ages 40 to 79    All pertinent labs, imaging, and EKGs reviewed.    Assessment:       1. Bilateral lower extremity edema         Plan:     No evidence of heart failure on exam  BP elevated today   Home  systolic    Minimal leg edema on the left  Likely venostasis, which is a possible cause of wound, which is then worsening  the edema  Once ok from wound care standpoint, would consider compression stocking  Home BP log  Once wound is healed, will consider venous doppler in the future to complete workup    Continue other cardiac medications  Mediterranean Diet/Cardiovascular Exercise Program    Patient queried and all questions were answered.    F/u in 6 months      Signed:    Antonio Sears MD  1/22/2020 12:36 PM

## 2020-01-22 NOTE — LETTER
January 22, 2020      Kayla Pereira MD  [address]  [place] [address]           [facility]  [address] [place] [address]  Phone: [phone]          Patient: Summer Ch   MR Number: [id number]   YOB: 1932   Date of Visit: 1/22/2020       Dear Dr. Kayla Pereira:    Thank you for referring Summer Ch to me for evaluation. Attached you will find relevant portions of my assessment and plan of care.    If you have questions, please do not hesitate to call me. I look forward to following Summer Ch along with you.    Sincerely,    Antonio Sears MD    Enclosure  CC:  No Recipients    If you would like to receive this communication electronically, please contact [email or web address] or [phone] to request more information on [employer] access.    For providers and/or their staff who would like to refer a patient to [facility], please contact us through our one-stop-shop provider referral line, [facility], at [phone].    If you feel you have received this communication in error or would no longer like to receive these types of communications, please e-mail [email or web address]

## 2020-01-27 ENCOUNTER — OFFICE VISIT (OUTPATIENT)
Dept: FAMILY MEDICINE | Facility: CLINIC | Age: 85
End: 2020-01-27
Payer: MEDICARE

## 2020-01-27 VITALS
BODY MASS INDEX: 17.77 KG/M2 | RESPIRATION RATE: 18 BRPM | HEART RATE: 59 BPM | HEIGHT: 63 IN | WEIGHT: 100.31 LBS | OXYGEN SATURATION: 97 % | DIASTOLIC BLOOD PRESSURE: 70 MMHG | SYSTOLIC BLOOD PRESSURE: 162 MMHG

## 2020-01-27 DIAGNOSIS — Z85.828 HISTORY OF SKIN CANCER: ICD-10-CM

## 2020-01-27 DIAGNOSIS — Z23 NEED FOR PNEUMOCOCCAL VACCINE: ICD-10-CM

## 2020-01-27 DIAGNOSIS — L98.9 LESION OF FACE: ICD-10-CM

## 2020-01-27 DIAGNOSIS — R63.0 DECREASED APPETITE: ICD-10-CM

## 2020-01-27 DIAGNOSIS — G93.9 BRAIN LESION: Primary | ICD-10-CM

## 2020-01-27 DIAGNOSIS — M79.2 NEUROPATHIC PAIN: ICD-10-CM

## 2020-01-27 DIAGNOSIS — K12.1 MOUTH ULCER: ICD-10-CM

## 2020-01-27 PROCEDURE — 90670 PNEUMOCOCCAL CONJUGATE VACCINE 13-VALENT LESS THAN 5YO & GREATER THAN: ICD-10-PCS | Mod: S$GLB,,, | Performed by: INTERNAL MEDICINE

## 2020-01-27 PROCEDURE — 1101F PT FALLS ASSESS-DOCD LE1/YR: CPT | Mod: CPTII,S$GLB,, | Performed by: INTERNAL MEDICINE

## 2020-01-27 PROCEDURE — 99214 OFFICE O/P EST MOD 30 MIN: CPT | Mod: 25,S$GLB,, | Performed by: INTERNAL MEDICINE

## 2020-01-27 PROCEDURE — 1125F AMNT PAIN NOTED PAIN PRSNT: CPT | Mod: S$GLB,,, | Performed by: INTERNAL MEDICINE

## 2020-01-27 PROCEDURE — G0009 ADMIN PNEUMOCOCCAL VACCINE: HCPCS | Mod: S$GLB,,, | Performed by: INTERNAL MEDICINE

## 2020-01-27 PROCEDURE — 99999 PR PBB SHADOW E&M-EST. PATIENT-LVL IV: ICD-10-PCS | Mod: PBBFAC,,, | Performed by: INTERNAL MEDICINE

## 2020-01-27 PROCEDURE — 99999 PR PBB SHADOW E&M-EST. PATIENT-LVL IV: CPT | Mod: PBBFAC,,, | Performed by: INTERNAL MEDICINE

## 2020-01-27 PROCEDURE — 1159F PR MEDICATION LIST DOCUMENTED IN MEDICAL RECORD: ICD-10-PCS | Mod: S$GLB,,, | Performed by: INTERNAL MEDICINE

## 2020-01-27 PROCEDURE — G0009 PNEUMOCOCCAL CONJUGATE VACCINE 13-VALENT LESS THAN 5YO & GREATER THAN: ICD-10-PCS | Mod: S$GLB,,, | Performed by: INTERNAL MEDICINE

## 2020-01-27 PROCEDURE — 90670 PCV13 VACCINE IM: CPT | Mod: S$GLB,,, | Performed by: INTERNAL MEDICINE

## 2020-01-27 PROCEDURE — 1125F PR PAIN SEVERITY QUANTIFIED, PAIN PRESENT: ICD-10-PCS | Mod: S$GLB,,, | Performed by: INTERNAL MEDICINE

## 2020-01-27 PROCEDURE — 1101F PR PT FALLS ASSESS DOC 0-1 FALLS W/OUT INJ PAST YR: ICD-10-PCS | Mod: CPTII,S$GLB,, | Performed by: INTERNAL MEDICINE

## 2020-01-27 PROCEDURE — 1159F MED LIST DOCD IN RCRD: CPT | Mod: S$GLB,,, | Performed by: INTERNAL MEDICINE

## 2020-01-27 PROCEDURE — 99214 PR OFFICE/OUTPT VISIT, EST, LEVL IV, 30-39 MIN: ICD-10-PCS | Mod: 25,S$GLB,, | Performed by: INTERNAL MEDICINE

## 2020-01-27 RX ORDER — GABAPENTIN 100 MG/1
200 CAPSULE ORAL NIGHTLY
Qty: 180 CAPSULE | Refills: 1 | Status: SHIPPED | OUTPATIENT
Start: 2020-01-27 | End: 2020-07-28 | Stop reason: SDUPTHER

## 2020-01-27 NOTE — PROGRESS NOTES
Assessment and Plan:    1. Brain lesion  Brain lesion as noted on MRI last visit, has scheduled evaluation by Neurosurgery tomorrow. Discussed this with patient and daughter today and answered questions to the best of my ability. She is very apprehensive about the concept of needing surgery. Will continue to review charts and patient advised to call me with any questions.     2. Neuropathic pain  Still having right sided headache and facial pain, likely related to brain lesion vs questionable history of shingles. Will continue gabapentin for pain control.   - gabapentin (NEURONTIN) 100 MG capsule; Take 2 capsules (200 mg total) by mouth every evening.  Dispense: 180 capsule; Refill: 1    3. Mouth ulcer  No lesions seen on exam today, but OK to try magic mouthwash to see if this improves pain.   - (Magic mouthwash) 1:1:1 Benadryl 12.5mg/5ml liq, aluminum & magnesium hydroxide-simehticone (Maalox), lidocaine viscous 2%; Swish and spit 5 mLs every 4 (four) hours as needed. for mouth sores  Dispense: 360 mL; Refill: 0    4. History of skin cancer  - Ambulatory referral to Dermatology  5. Lesion of face  - Ambulatory referral to Dermatology    Patient has known history of multiple BCCs and SCCs in the past. Currently has lesion on left cheek as pictured below that has been non-healing since biopsy in April. Pathology at that time only showed actinic keratosis with milia (will scan record), but it is most likely skin cancer at this time. Unclear if this is in some way related to the brain lesion on the same side.     6. Decreased appetite  Had been on megace previously, but she is not certain if this was helping. Has been trying to drink boost. Will readdress at next visit once we know more about the plan for the brain lesion. Consider megace vs mirtazapine.     7. Need for pneumococcal vaccine  - Pneumococcal Conjugate Vaccine (13 Valent)  (IM)    ______________________________________________________________________  Subjective:    Chief Complaint:  Follow up chronic medical conditions.    HPI:  Summer is a 87 y.o. year old female here to follow up chronic medical conditions.     Brain tumor- Since last visit with me, we have been able to schedule patient to see Dr. Bush tomorrow, has not been seen yet. Reports feeling nervous about this upcoming appointment. I had also been able to get in touch with Dr. Carias and discuss this MRI result.     Leg ulcer- Established with wound care and being treated for venous stasis ulcer.     Since last visit with me, has established care with cardiology. Did not have irregular rhythm at the time of that visit per Cardiology notes. Reviewed echo with patient and thought to have more of venous stasis than heart failure as cause of edema. Discussed possible workup with venous doppler and compression stockings in future, but would want to make sure wound healed first.     She is still having pain in her scalp and right side of her head. She has been taking gabapentin 200 mg nightly and this has been partially helpful. She also reports that she feels some pain in her mouth occasionally like mouth ulcers. Has tried gargling with salt water and did not find this to be effective.     Patient reports feeling very nervous today about the upcoming apt with Neurosurgery.     Medications:  Current Outpatient Medications on File Prior to Visit   Medication Sig Dispense Refill    acetaminophen (TYLENOL ORAL) Take 1 Dose by mouth 2 (two) times daily as needed.      collagenase (SANTYL) ointment Apply topically once daily. 90 g 2    dorzolamide (TRUSOPT) 2 % ophthalmic solution   6    LUMIGAN 0.01 % Drop       multivitamin (THERAGRAN) per tablet Take 1 tablet by mouth once daily.      timolol maleate 0.5% (TIMOPTIC) 0.5 % Drop   6    [DISCONTINUED] gabapentin (NEURONTIN) 100 MG capsule        No current  "facility-administered medications on file prior to visit.        Review of Systems:  Review of Systems   Constitutional: Positive for appetite change (decreased). Negative for activity change.   HENT: Positive for mouth sores. Negative for trouble swallowing.    Neurological: Positive for facial asymmetry. Negative for speech difficulty.       Past Medical History:  Past Medical History:   Diagnosis Date    Cancer     skin cancer       Objective:    Vitals:  Vitals:    01/27/20 1425   BP: (!) 162/70   Pulse: (!) 59   Resp: 18   SpO2: 97%   Weight: 45.5 kg (100 lb 5 oz)   Height: 5' 3" (1.6 m)   PainSc:   4   PainLoc: Abdomen       Physical Exam   Constitutional: She is oriented to person, place, and time. She appears well-developed and well-nourished. No distress.   in wheelchair   HENT:   Mouth/Throat: Oropharynx is clear and moist.   no mouth ulcers visualized   Eyes:   left eye has complete ptosis (unable to hold eyelid open at all)   Neck: Normal range of motion. No JVD present. No thyromegaly present.   Cardiovascular: Normal rate and intact distal pulses.   Murmur (faint systolic murmur at base of heart) heard.  predominantly regular rate with some irregular beats   Pulmonary/Chest: Effort normal and breath sounds normal. No respiratory distress. She has no wheezes. She has no rales.   Abdominal: Soft. She exhibits no distension.   Musculoskeletal:   1+ pitting edema to bilateral lower extremities up to mid shin   Neurological: She is alert and oriented to person, place, and time.   Skin: Skin is warm and dry. She is not diaphoretic.   left central cheek has ulcerated lesion as pictured   Psychiatric: She has a normal mood and affect. Her behavior is normal. Judgment and thought content normal.             Data:    Outside records from Dr. Lemon reviewed    Biopsy of lesion on right cheek from April 2019 only showing actinic keratosis with rajeev Pereira MD  Internal Medicine  "

## 2020-01-28 ENCOUNTER — OFFICE VISIT (OUTPATIENT)
Dept: NEUROSURGERY | Facility: CLINIC | Age: 85
End: 2020-01-28
Payer: MEDICARE

## 2020-01-28 ENCOUNTER — TELEPHONE (OUTPATIENT)
Dept: NEUROSURGERY | Facility: CLINIC | Age: 85
End: 2020-01-28

## 2020-01-28 VITALS
HEART RATE: 81 BPM | BODY MASS INDEX: 17.77 KG/M2 | DIASTOLIC BLOOD PRESSURE: 74 MMHG | TEMPERATURE: 99 F | WEIGHT: 100.31 LBS | SYSTOLIC BLOOD PRESSURE: 125 MMHG

## 2020-01-28 DIAGNOSIS — G93.9 BRAIN LESION: Primary | ICD-10-CM

## 2020-01-28 DIAGNOSIS — G93.9 BRAIN LESION: ICD-10-CM

## 2020-01-28 DIAGNOSIS — Z85.828 HISTORY OF SKIN CANCER: Primary | ICD-10-CM

## 2020-01-28 DIAGNOSIS — Z85.828 HISTORY OF SKIN CANCER: ICD-10-CM

## 2020-01-28 DIAGNOSIS — M79.2 NEUROPATHIC PAIN: ICD-10-CM

## 2020-01-28 DIAGNOSIS — C79.31 BRAIN METASTASES: ICD-10-CM

## 2020-01-28 PROCEDURE — 1159F PR MEDICATION LIST DOCUMENTED IN MEDICAL RECORD: ICD-10-PCS | Mod: S$GLB,,, | Performed by: NEUROLOGICAL SURGERY

## 2020-01-28 PROCEDURE — 1159F MED LIST DOCD IN RCRD: CPT | Mod: S$GLB,,, | Performed by: NEUROLOGICAL SURGERY

## 2020-01-28 PROCEDURE — 1126F AMNT PAIN NOTED NONE PRSNT: CPT | Mod: S$GLB,,, | Performed by: NEUROLOGICAL SURGERY

## 2020-01-28 PROCEDURE — 99204 OFFICE O/P NEW MOD 45 MIN: CPT | Mod: S$GLB,,, | Performed by: NEUROLOGICAL SURGERY

## 2020-01-28 PROCEDURE — 1126F PR PAIN SEVERITY QUANTIFIED, NO PAIN PRESENT: ICD-10-PCS | Mod: S$GLB,,, | Performed by: NEUROLOGICAL SURGERY

## 2020-01-28 PROCEDURE — 99204 PR OFFICE/OUTPT VISIT, NEW, LEVL IV, 45-59 MIN: ICD-10-PCS | Mod: S$GLB,,, | Performed by: NEUROLOGICAL SURGERY

## 2020-01-28 PROCEDURE — 1101F PR PT FALLS ASSESS DOC 0-1 FALLS W/OUT INJ PAST YR: ICD-10-PCS | Mod: CPTII,S$GLB,, | Performed by: NEUROLOGICAL SURGERY

## 2020-01-28 PROCEDURE — 1101F PT FALLS ASSESS-DOCD LE1/YR: CPT | Mod: CPTII,S$GLB,, | Performed by: NEUROLOGICAL SURGERY

## 2020-01-28 PROCEDURE — 99999 PR PBB SHADOW E&M-EST. PATIENT-LVL III: CPT | Mod: PBBFAC,,, | Performed by: NEUROLOGICAL SURGERY

## 2020-01-28 PROCEDURE — 99999 PR PBB SHADOW E&M-EST. PATIENT-LVL III: ICD-10-PCS | Mod: PBBFAC,,, | Performed by: NEUROLOGICAL SURGERY

## 2020-01-28 RX ORDER — TRAMADOL HYDROCHLORIDE 50 MG/1
50 TABLET ORAL EVERY 6 HOURS PRN
Qty: 30 TABLET | Refills: 1 | Status: SHIPPED | OUTPATIENT
Start: 2020-01-28 | End: 2020-10-11 | Stop reason: ALTCHOICE

## 2020-01-28 RX ORDER — TRAMADOL HYDROCHLORIDE 50 MG/1
50 TABLET ORAL EVERY 6 HOURS PRN
Qty: 30 TABLET | Refills: 1 | Status: SHIPPED | OUTPATIENT
Start: 2020-01-28 | End: 2020-01-28 | Stop reason: SDUPTHER

## 2020-01-28 NOTE — PATIENT INSTRUCTIONS
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 Pt's facial symptoms secondary to infiltration of the nerves.    Will order a whole-body CT- PET to rule out metastasis and MRI brain W/WO Contrast to r/o progression.     Pt prescribed Tramadol for pain control.      I will schedule the patient for follow up on Friday, January 31, 2020.

## 2020-01-28 NOTE — LETTER
January 29, 2020      Kayla Pereira MD  [address]  [place] [address]           [facility]  [address]  [place] [address]  Phone: [phone]          Patient: Summer Ch   MR Number: [id number]   YOB: 1932   Date of Visit: 1/28/2020       Dear Dr. Kayla Pereira:    Thank you for referring Summer Ch to me for evaluation. Attached you will find relevant portions of my assessment and plan of care.    If you have questions, please do not hesitate to call me. I look forward to following Summer Ch along with you.    Sincerely,    Manuel Bush MD    Enclosure  CC:  No Recipients    If you would like to receive this communication electronically, please contact [email or web address] or [phone] to request more information on [employer] access.    For providers and/or their staff who would like to refer a patient to [facility], please contact us through our one-stop-shop provider referral line, [facility], at [phone].    If you feel you have received this communication in error or would no longer like to receive these types of communications, please e-mail [email or web address]

## 2020-01-28 NOTE — PROGRESS NOTES
Subjective:   I, Jean Ratliff, attest that this documentation has been prepared under the direction and in the presence of Manuel Bush MD.     Patient ID: Summer Ch is a 87 y.o. female     Chief Complaint: No chief complaint on file.      HPI  Ms. Summer Ch is a pleasant 87 y.o. woman who was referred by Dr. Mack in regards to newly discovered brain lesion. Pt is accompanied by her daughter, Rosita, who reports pt had an onset of shingle-like symptoms in July 2019 that initially affected her left eyelid and vision. Pt notably lost the vision out of her left eye a few weeks ago and endorses associated eye pain, which she rates a 4-5/10 in intensity. She endorses gross ptosis, difficulty chewing on the left side, left hemifacial numbness, and oral ulcers. She also notes losing approximately 10 lbs, which daughter attributes to decreased appetite and inactivity since being diagnosed with venous stasis (per Cardiology) and requiring bedrest. She is being followed by wound care for the venous stasis and states the swelling has improved such that she was recently given clearance to increase activity and use compression stockings. She is currently on Gabapentin. She endorses hx of basal cell carcinoma.      Review of Systems   Constitutional: Positive for activity change (decreased 2/2 leg edema), appetite change (decreased) and unexpected weight change (lost ~10 lbs). Negative for fatigue and fever.   HENT: Negative for facial swelling.    Eyes: Positive for pain (left) and visual disturbance (decreased visual acuity).        + left Ophthalmoplegia.   Respiratory: Negative.    Cardiovascular: Positive for leg swelling (improving).   Gastrointestinal: Negative for diarrhea, nausea and vomiting.   Genitourinary: Negative.    Musculoskeletal: Negative for back pain, joint swelling, myalgias and neck pain.   Skin: Negative for wound.   Neurological: Positive for numbness (left hemifacial). Negative for  dizziness, weakness and headaches.   Psychiatric/Behavioral: Negative.       Past Medical History:   Diagnosis Date    Cancer     skin cancer       Objective:      Vitals:    01/28/20 0951   BP: 125/74   Pulse: 81   Temp: 99.2 °F (37.3 °C)      Physical Exam   Constitutional: She is oriented to person, place, and time. She appears well-developed and well-nourished.   HENT:   Head: Normocephalic and atraumatic.   Neck: Neck supple.   Neurological: She is alert and oriented to person, place, and time. A cranial nerve deficit (gross ophthalmoplegia) is present. She displays a negative Romberg sign. GCS eye subscore is 4. GCS verbal subscore is 5. GCS motor subscore is 6.   Diminished sensation to light touch of the left face.       IMAGING:  MRI Orbits W W/O Contrast (12/27/2019) shows an enhancing lesion within the left cavernous sinus that extends into the infratemporal fossa.    I have personally reviewed the images with the pt.      I, Dr. Manuel Bush, personally performed the services described in this documentation. All medical record entries made by the scribe, Jean Ratliff, were at my direction and in my presence.  I have reviewed the chart and agree that the record reflects my personal performance and is accurate and complete. Manuel Bush MD. 01/28/2020    Assessment:       1. Brain lesion    2. Neuropathic pain    3. 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 Pt's facial symptoms secondary to infiltration of the nerves.    Will order a whole-body CT- PET to rule out metastasis and MRI brain W/WO Contrast to r/o progression.     Pt prescribed Tramadol for pain control.      I will schedule the patient for follow up on Friday, January 31, 2020.

## 2020-02-03 ENCOUNTER — PATIENT OUTREACH (OUTPATIENT)
Dept: ADMINISTRATIVE | Facility: OTHER | Age: 85
End: 2020-02-03

## 2020-02-04 ENCOUNTER — OFFICE VISIT (OUTPATIENT)
Dept: NEUROSURGERY | Facility: CLINIC | Age: 85
End: 2020-02-04
Payer: MEDICARE

## 2020-02-04 ENCOUNTER — HOSPITAL ENCOUNTER (OUTPATIENT)
Dept: RADIOLOGY | Facility: HOSPITAL | Age: 85
Discharge: HOME | End: 2020-02-04
Attending: NEUROLOGICAL SURGERY
Payer: MEDICARE

## 2020-02-04 ENCOUNTER — PATIENT OUTREACH (OUTPATIENT)
Dept: ADMINISTRATIVE | Facility: HOSPITAL | Age: 85
End: 2020-02-04

## 2020-02-04 VITALS — TEMPERATURE: 98 F | SYSTOLIC BLOOD PRESSURE: 152 MMHG | DIASTOLIC BLOOD PRESSURE: 94 MMHG | HEART RATE: 70 BPM

## 2020-02-04 DIAGNOSIS — Z85.828 HISTORY OF SKIN CANCER: ICD-10-CM

## 2020-02-04 DIAGNOSIS — C79.31 BRAIN METASTASES: ICD-10-CM

## 2020-02-04 DIAGNOSIS — G93.9 BRAIN LESION: Primary | ICD-10-CM

## 2020-02-04 LAB — POCT GLUCOSE: 74 MG/DL (ref 70–110)

## 2020-02-04 PROCEDURE — 25500020 PHARM REV CODE 255: Performed by: NEUROLOGICAL SURGERY

## 2020-02-04 PROCEDURE — 99499 UNLISTED E&M SERVICE: CPT | Mod: S$GLB,,, | Performed by: NEUROLOGICAL SURGERY

## 2020-02-04 PROCEDURE — 1101F PT FALLS ASSESS-DOCD LE1/YR: CPT | Mod: CPTII,S$GLB,, | Performed by: NEUROLOGICAL SURGERY

## 2020-02-04 PROCEDURE — 1126F AMNT PAIN NOTED NONE PRSNT: CPT | Mod: S$GLB,,, | Performed by: NEUROLOGICAL SURGERY

## 2020-02-04 PROCEDURE — 78816 NM PET CT WHOLE BODY: ICD-10-PCS | Mod: 26,PI,, | Performed by: RADIOLOGY

## 2020-02-04 PROCEDURE — 70553 MRI BRAIN STEM W/O & W/DYE: CPT | Mod: 26,,, | Performed by: RADIOLOGY

## 2020-02-04 PROCEDURE — 99214 PR OFFICE/OUTPT VISIT, EST, LEVL IV, 30-39 MIN: ICD-10-PCS | Mod: S$GLB,,, | Performed by: NEUROLOGICAL SURGERY

## 2020-02-04 PROCEDURE — 1126F PR PAIN SEVERITY QUANTIFIED, NO PAIN PRESENT: ICD-10-PCS | Mod: S$GLB,,, | Performed by: NEUROLOGICAL SURGERY

## 2020-02-04 PROCEDURE — 78816 PET IMAGE W/CT FULL BODY: CPT | Mod: 26,PI,, | Performed by: RADIOLOGY

## 2020-02-04 PROCEDURE — 99499 RISK ADDL DX/OHS AUDIT: ICD-10-PCS | Mod: S$GLB,,, | Performed by: NEUROLOGICAL SURGERY

## 2020-02-04 PROCEDURE — 70553 MRI BRAIN W WO CONTRAST: ICD-10-PCS | Mod: 26,,, | Performed by: RADIOLOGY

## 2020-02-04 PROCEDURE — A9585 GADOBUTROL INJECTION: HCPCS | Performed by: NEUROLOGICAL SURGERY

## 2020-02-04 PROCEDURE — 99214 OFFICE O/P EST MOD 30 MIN: CPT | Mod: S$GLB,,, | Performed by: NEUROLOGICAL SURGERY

## 2020-02-04 PROCEDURE — 1159F PR MEDICATION LIST DOCUMENTED IN MEDICAL RECORD: ICD-10-PCS | Mod: S$GLB,,, | Performed by: NEUROLOGICAL SURGERY

## 2020-02-04 PROCEDURE — 99999 PR PBB SHADOW E&M-EST. PATIENT-LVL III: CPT | Mod: PBBFAC,,, | Performed by: NEUROLOGICAL SURGERY

## 2020-02-04 PROCEDURE — 99999 PR PBB SHADOW E&M-EST. PATIENT-LVL III: ICD-10-PCS | Mod: PBBFAC,,, | Performed by: NEUROLOGICAL SURGERY

## 2020-02-04 PROCEDURE — 78816 PET IMAGE W/CT FULL BODY: CPT | Mod: TC,PI

## 2020-02-04 PROCEDURE — 1101F PR PT FALLS ASSESS DOC 0-1 FALLS W/OUT INJ PAST YR: ICD-10-PCS | Mod: CPTII,S$GLB,, | Performed by: NEUROLOGICAL SURGERY

## 2020-02-04 PROCEDURE — 1159F MED LIST DOCD IN RCRD: CPT | Mod: S$GLB,,, | Performed by: NEUROLOGICAL SURGERY

## 2020-02-04 PROCEDURE — 70553 MRI BRAIN STEM W/O & W/DYE: CPT | Mod: TC

## 2020-02-04 RX ORDER — GADOBUTROL 604.72 MG/ML
5 INJECTION INTRAVENOUS
Status: COMPLETED | OUTPATIENT
Start: 2020-02-04 | End: 2020-02-04

## 2020-02-04 RX ADMIN — GADOBUTROL 5 ML: 604.72 INJECTION INTRAVENOUS at 12:02

## 2020-02-04 NOTE — PROGRESS NOTES
Subjective:   I, Jean Ratliff, attest that this documentation has been prepared under the direction and in the presence of Manuel Bush MD.     Patient ID: Summer Ch is a 87 y.o. female     Chief Complaint: Follow-up      HPI  Ms. Summer Ch is a pleasant 87 y.o. woman with a hx of basal cell CA of the face and newly-diagnosed brain lesion who presents today for follow up with updated MRI brain and CT-PET. This is a pt who had an onset of shingle-like symptoms in July 2019 that initially affected her left eyelid and vision and notably lost the vision out of her left eye a few weeks ago. She was seen for a preliminary evaluation on 01/28/2020 and endorsed associated eye pain of 4-5/10 intensity, gross left ptosis, difficulty chewing on the left side, left hemifacial numbness, and oral ulcers. She also noted losing approximately 10 lbs, which daughter attributed to decreased appetite and inactivity since being diagnosed with venous stasis (per Cardiology) and requiring bedrest. Upon my review, her MRI from 12/27/2019, showed an enhancing lesion within the left cavernous sinus extending into the infratemporal fossa.     Today pt is accompanied by her son, two daughters, and son in law. They believe pt has had a biopsy of the facial lesion in her left cheek several years ago but are not entirely sure. She is scheduled to see Dermatologist, Dr. Tonia Marques, tomorrow. Pt's family concerned for maintaining her quality of life. She is being followed by wound care for the venous stasis and swelling she had. Pt currently seated in wheelchair.      Review of Systems   Constitutional: Positive for activity change (decreased 2/2 leg edema), appetite change (decreased) and unexpected weight change (lost ~10 lbs). Negative for fatigue and fever.   HENT: Negative for facial swelling.    Eyes: Positive for pain (left) and visual disturbance (decreased visual acuity).        + left Ophthalmoplegia.   Respiratory:  Negative.    Cardiovascular: Positive for leg swelling (improving).   Gastrointestinal: Negative for diarrhea, nausea and vomiting.   Genitourinary: Negative.    Musculoskeletal: Negative for back pain, joint swelling, myalgias and neck pain.   Skin: Negative for wound.   Neurological: Positive for numbness (left hemifacial). Negative for dizziness, weakness and headaches.   Psychiatric/Behavioral: Negative.       Past Medical History:   Diagnosis Date    Cancer     skin cancer       Objective:      Vitals:    02/04/20 1238   BP: (!) 152/94   Pulse: 70   Temp: 97.9 °F (36.6 °C)      Physical Exam   Constitutional: She is oriented to person, place, and time. She appears well-developed and well-nourished.   HENT:   Head: Normocephalic and atraumatic.   Neck: Neck supple.   Neurological: She is alert and oriented to person, place, and time. No cranial nerve deficit. She displays a negative Romberg sign. GCS eye subscore is 4. GCS verbal subscore is 5. GCS motor subscore is 6.          IMAGING:  MRI Brain W WO Contrast (02/04/2020) shows an expansile lesion involving the infratemporal fossa on the left, the cavernous sinus, and the orbit.     NM PET CT Whole Body (02/04/2020) shows physiologic uptake of the tracer is present within the brain, salivary glands, myocardium, GI and  tracts.       I have personally reviewed the images with the pt.      I, Dr. Manuel Bush, personally performed the services described in this documentation. All medical record entries made by the scribe, Jean Ratliff, were at my direction and in my presence.  I have reviewed the chart and agree that the record reflects my personal performance and is accurate and complete. Manuel Bush MD. 02/04/2020    Assessment:       1. Brain lesion    2. Brain metastases         Plan:   I have personally reviewed the NM PET CT with the pt which shows physiologic uptake of the tracer is present within the brain, salivary glands, myocardium, GI and   tracts. MRI brain shows an expansile lesion involving the infratemporal fossa on the left, the cavernous sinus, and the orbit.     I recommend a biopsy of the lesion in the left check with dermatology to establish diagnosis.    Family express that they would like to conserve the pt's quality of life and are afraid that extensive therapy would degrade her current status. I mentioned that palliative therapy would be an option to consider if they see it fit for the pt. Will refer to radiation/oncology for further evaluation and discussion.     I will schedule the patient for 3 month follow up with MRI Brain.

## 2020-02-04 NOTE — PATIENT INSTRUCTIONS
I have personally reviewed the NM PET CT with the pt which shows physiologic uptake of the tracer is present within the brain, salivary glands, myocardium, GI and  tracts. MRI brain shows an expansile lesion into the infratemporal fossa on the left, the cavernous sinus, and the orbit.     I recommend a biopsy of the lesion in the left check with dermatology to establish diagnosis.  Will refer to radiation/oncology for further evaluation.    I will schedule the patient for 3 month follow up with MRI Brain.

## 2020-02-05 ENCOUNTER — PATIENT MESSAGE (OUTPATIENT)
Dept: FAMILY MEDICINE | Facility: CLINIC | Age: 85
End: 2020-02-05

## 2020-02-05 ENCOUNTER — INITIAL CONSULT (OUTPATIENT)
Dept: DERMATOLOGY | Facility: CLINIC | Age: 85
End: 2020-02-05
Payer: MEDICARE

## 2020-02-05 VITALS — BODY MASS INDEX: 17.77 KG/M2 | WEIGHT: 100.31 LBS | RESPIRATION RATE: 18 BRPM | HEIGHT: 63 IN

## 2020-02-05 DIAGNOSIS — D48.5 NEOPLASM OF UNCERTAIN BEHAVIOR OF SKIN: Primary | ICD-10-CM

## 2020-02-05 PROCEDURE — 11102 PR TANGENTIAL BIOPSY, SKIN, SINGLE LESION: ICD-10-PCS | Mod: S$GLB,,, | Performed by: DERMATOLOGY

## 2020-02-05 PROCEDURE — 88305 TISSUE EXAM BY PATHOLOGIST: CPT | Performed by: PATHOLOGY

## 2020-02-05 PROCEDURE — 99499 UNLISTED E&M SERVICE: CPT | Mod: S$GLB,,, | Performed by: DERMATOLOGY

## 2020-02-05 PROCEDURE — 11102 TANGNTL BX SKIN SINGLE LES: CPT | Mod: S$GLB,,, | Performed by: DERMATOLOGY

## 2020-02-05 PROCEDURE — 88305 TISSUE EXAM BY PATHOLOGIST: CPT | Mod: 26,,, | Performed by: PATHOLOGY

## 2020-02-05 PROCEDURE — 99999 PR PBB SHADOW E&M-EST. PATIENT-LVL III: ICD-10-PCS | Mod: PBBFAC,,, | Performed by: DERMATOLOGY

## 2020-02-05 PROCEDURE — 99999 PR PBB SHADOW E&M-EST. PATIENT-LVL III: CPT | Mod: PBBFAC,,, | Performed by: DERMATOLOGY

## 2020-02-05 PROCEDURE — 88305 TISSUE EXAM BY PATHOLOGIST: ICD-10-PCS | Mod: 26,,, | Performed by: PATHOLOGY

## 2020-02-05 PROCEDURE — 99499 RISK ADDL DX/OHS AUDIT: ICD-10-PCS | Mod: S$GLB,,, | Performed by: DERMATOLOGY

## 2020-02-05 RX ORDER — MUPIROCIN 20 MG/G
OINTMENT TOPICAL 3 TIMES DAILY
COMMUNITY
End: 2020-10-11 | Stop reason: ALTCHOICE

## 2020-02-05 NOTE — LETTER
February 5, 2020      Kayla Pereira MD  [address] [place] [address]           [facility]  [address] [place] [address]  Phone: [phone]  Fax: [phone]          Patient: Summer Ch   MR Number: [id number]   YOB: 1932   Date of Visit: 2/5/2020       Dear Dr. Kayla Pereira:    Thank you for referring Summer Ch to me for evaluation. Attached you will find relevant portions of my assessment and plan of care.    If you have questions, please do not hesitate to call me. I look forward to following Summer Ch along with you.    Sincerely,    Tonia Marques MD    Enclosure  CC:  No Recipients    If you would like to receive this communication electronically, please contact [email or web address] or [phone] to request more information on [employer] access.    For providers and/or their staff who would like to refer a patient to [facility], please contact us through our one-stop-shop provider referral line, [facility], at [phone].    If you feel you have received this communication in error or would no longer like to receive these types of communications, please e-mail [email or web address]

## 2020-02-05 NOTE — TELEPHONE ENCOUNTER
Please try to schedule last visit of day or last visit of morning as we will need additional time.

## 2020-02-05 NOTE — PROGRESS NOTES
"  Subjective:       Patient ID:  Summer Ch is a 87 y.o. female who presents for   Chief Complaint   Patient presents with    Lesion     88 yo F presents with her daughter for initial visit.  They noticed a lesion on her L cheek x 6-8 months. They think she had a lesion biopsied in the past (and removed although they can't recall how long ago) and states lesion "did not heal completely". They have been treating with mupriocin 2% ointment TID daily with no improvement.      She was recently diagnosed with brain mass (2/5/20) (include PET and MRI results)    Derm Hx: multiple BCCs and SCCs removed from her face     Past Medical History:  Cancer      Comment:  skin cancer        Review of Systems   Constitutional: Negative for fever and chills.   HENT: Negative for sore throat.    Respiratory: Negative for cough.    Gastrointestinal: Negative for nausea and vomiting.   Skin: Positive for dry skin. Negative for itching and rash.        Objective:    Physical Exam   Constitutional: She appears well-developed and well-nourished. No distress.   Neurological: She is alert and oriented to person, place, and time. She is not disoriented.   Psychiatric: She has a normal mood and affect.   Skin:   Areas Examined (abnormalities noted in diagram):   Head / Face Inspection Performed                  Diagram Legend     Erythematous scaling macule/papule c/w actinic keratosis       Vascular papule c/w angioma      Pigmented verrucoid papule/plaque c/w seborrheic keratosis      Yellow umbilicated papule c/w sebaceous hyperplasia      Irregularly shaped tan macule c/w lentigo     1-2 mm smooth white papules consistent with Milia      Movable subcutaneous cyst with punctum c/w epidermal inclusion cyst      Subcutaneous movable cyst c/w pilar cyst      Firm pink to brown papule c/w dermatofibroma      Pedunculated fleshy papule(s) c/w skin tag(s)      Evenly pigmented macule c/w junctional nevus     Mildly variegated pigmented, " slightly irregular-bordered macule c/w mildly atypical nevus      Flesh colored to evenly pigmented papule c/w intradermal nevus       Pink pearly papule/plaque c/w basal cell carcinoma      Erythematous hyperkeratotic cursted plaque c/w SCC      Surgical scar with no sign of skin cancer recurrence      Open and closed comedones      Inflammatory papules and pustules      Verrucoid papule consistent consistent with wart     Erythematous eczematous patches and plaques     Dystrophic onycholytic nail with subungual debris c/w onychomycosis     Umbilicated papule    Erythematous-base heme-crusted tan verrucoid plaque consistent with inflamed seborrheic keratosis     Erythematous Silvery Scaling Plaque c/w Psoriasis     See annotation      MRI Brain 2/4/2020  FINDINGS:  Continued but slightly increased sized abnormal heterogeneous enhancement left pre maxillary soft tissues with overlying questioned cutaneous ulceration concerning for cutaneous neoplasm.    Region in the pre maxillary soft tissues on T1 precontrast imaging measures approximately 1.7 x 2.2 cm in AP by TV dimensions previously measuring 1.8 x 1.6 cm.    There is enhancing material extending along the left infraorbital foramen diffusely to the pterygopalatine fossa.    There is continue though progressed abnormal enhancement involving left Meckel's cave which extends along the left foramen ovale into the infratemporal fossa with diffuse enhancement infratemporal fossa with additional involvement within the left pterygopalatine fossa, foramen rotundum, sphenopalatine foramen as well as vidian canal.  This extends superiorly into the infraorbital fissure with globular enhancement within the posterior aspect left orbit more pronounced laterally with mass effect on the left optic nerve.  Due to the fusiform configuration somewhat difficult to quantify with component centered within the left Meckel's cave measuring approximately 2.3 cm AP with expansion.   Allowing for differences in technique this previously measured 1.8 cm AP.    Continued left eustachian tube dysfunction and opacification left mastoid air cells and middle ear cavity with fluid.    There is partial fluid opacification right mastoid air cells.    There is concern for left cavernous sinus extension and possible encasement left cavernous carotid.    The brain parenchyma is stable in signal and contour there is no focal restricted diffusion to suggest acute infarction.  There is no midline shift.  There is tubular focus of enhancement left parasagittal frontal lobe suggestive for a developmental venous anomaly    There is no evidence for acute infarction.  No new parenchymal signal abnormality.  Scattered foci of T2 FLAIR signal hyperintensity elsewhere supratentorial white matter which are nonspecific and suggestive for mild chronic ischemic change.  There is slight prominence of the extra-axial spaces overlying the cerebral hemispheres bilaterally similar to prior.    Spondylo degenerative change of the cervical spine partially included in the study.  Further evaluation dedicated cervical spine imaging as warranted.    Assessment / Plan:      Pathology Orders:     Normal Orders This Visit    Specimen to Pathology, Dermatology     Questions:    Procedure Type:  Dermatology and skin neoplasms    Number of Specimens:  1    ------------------------:  -------------------------    Spec 1 Procedure:  Biopsy    Spec 1 Clinical Impression:  1 cm erythematous nodule at surgical scar (surgery performed at outside facility unknown number of years ago); SCC vs BCC vs other    Spec 1 Source:  L medial cheek        Neoplasm of uncertain behavior of skin  -     Specimen to Pathology, Dermatology-  Shave biopsy procedure note:    Shave biopsy performed after verbal consent including risk of infection, scar, recurrence, need for additional treatment of site. Area prepped with alcohol, anesthetized with approximately  1.0cc of 1% lidocaine with epinephrine. Lesional tissue shaved with razor blade. Hemostasis achieved with application of aluminum chloride followed by hyfrecation. No complications. Dressing applied. Wound care explained.               No follow-ups on file.

## 2020-02-06 NOTE — TELEPHONE ENCOUNTER
Dr Pereira, pts daughter wants to know if this appt to discuss palliative care needs to have her mother present or do you just want some time held for a conference  Please advise

## 2020-02-07 ENCOUNTER — PATIENT MESSAGE (OUTPATIENT)
Dept: FAMILY MEDICINE | Facility: CLINIC | Age: 85
End: 2020-02-07

## 2020-02-07 ENCOUNTER — TELEPHONE (OUTPATIENT)
Dept: NEUROSURGERY | Facility: CLINIC | Age: 85
End: 2020-02-07

## 2020-02-07 NOTE — TELEPHONE ENCOUNTER
After getting the phone #, I called [facility]. Spoke with Lissette. Gave her pt's info.  She will call the patient to schedule.

## 2020-02-07 NOTE — TELEPHONE ENCOUNTER
I don't think it is really possible to have a meeting without the patient, and she is definitely still with it to the point that she can participate in a discussion so I do not think we should talk without her.

## 2020-02-10 ENCOUNTER — TELEPHONE (OUTPATIENT)
Dept: DERMATOLOGY | Facility: CLINIC | Age: 85
End: 2020-02-10

## 2020-02-10 NOTE — TELEPHONE ENCOUNTER
Daughter Delicia contacted with result . She will speak w/ brother & sister to decide on how to pursue. They are also waiting for appointment w/ Radiologist Oncologist at [facility] . They would also like to speak with Radiologist .  Daughter stated she will call office back to let Dr Marques know how they wish to proceed .         --- Message from Tonia Marques MD sent at 2/10/2020  4:01 PM CST -----  Please let daughter know that the biopsy showed SCC.  Please let us know if we can be of any further assistance. Not sure if they are interested in treatment at this time.     Thanks  AURA

## 2020-02-11 ENCOUNTER — TELEPHONE (OUTPATIENT)
Dept: NEUROSURGERY | Facility: CLINIC | Age: 85
End: 2020-02-11

## 2020-02-11 ENCOUNTER — PATIENT MESSAGE (OUTPATIENT)
Dept: FAMILY MEDICINE | Facility: CLINIC | Age: 85
End: 2020-02-11

## 2020-02-11 NOTE — TELEPHONE ENCOUNTER
Spoke with pt's daughter. Pt is going to see Dr. Jose Cruz (Rad Onc) tomorrow and would like to have disc with pt's MRIs made. Contacted film lexiabry to have disc made. Pt will need to sign a letter authorizing her son to  the disc and then fill out an Sebastian once he arrives to pick it up. She v/u and in agreement with plan.

## 2020-02-17 ENCOUNTER — PATIENT MESSAGE (OUTPATIENT)
Dept: FAMILY MEDICINE | Facility: CLINIC | Age: 85
End: 2020-02-17

## 2020-02-19 ENCOUNTER — PATIENT MESSAGE (OUTPATIENT)
Dept: FAMILY MEDICINE | Facility: CLINIC | Age: 85
End: 2020-02-19

## 2020-02-28 ENCOUNTER — OFFICE VISIT (OUTPATIENT)
Dept: FAMILY MEDICINE | Facility: CLINIC | Age: 85
End: 2020-02-28
Payer: MEDICARE

## 2020-02-28 ENCOUNTER — TELEPHONE (OUTPATIENT)
Dept: FAMILY MEDICINE | Facility: CLINIC | Age: 85
End: 2020-02-28

## 2020-02-28 VITALS
HEIGHT: 63 IN | OXYGEN SATURATION: 98 % | SYSTOLIC BLOOD PRESSURE: 148 MMHG | BODY MASS INDEX: 17.46 KG/M2 | DIASTOLIC BLOOD PRESSURE: 80 MMHG | RESPIRATION RATE: 18 BRPM | WEIGHT: 98.56 LBS | HEART RATE: 78 BPM

## 2020-02-28 DIAGNOSIS — G89.3 CANCER RELATED PAIN: ICD-10-CM

## 2020-02-28 DIAGNOSIS — C44.329 SQUAMOUS CELL CARCINOMA OF CHEEK: ICD-10-CM

## 2020-02-28 DIAGNOSIS — D49.6 BRAIN TUMOR: Primary | ICD-10-CM

## 2020-02-28 DIAGNOSIS — G47.00 INSOMNIA, UNSPECIFIED TYPE: ICD-10-CM

## 2020-02-28 DIAGNOSIS — R63.0 DECREASED APPETITE: ICD-10-CM

## 2020-02-28 PROCEDURE — 1101F PR PT FALLS ASSESS DOC 0-1 FALLS W/OUT INJ PAST YR: ICD-10-PCS | Mod: CPTII,S$GLB,, | Performed by: INTERNAL MEDICINE

## 2020-02-28 PROCEDURE — 99499 UNLISTED E&M SERVICE: CPT | Mod: S$GLB,,, | Performed by: INTERNAL MEDICINE

## 2020-02-28 PROCEDURE — 1159F MED LIST DOCD IN RCRD: CPT | Mod: S$GLB,,, | Performed by: INTERNAL MEDICINE

## 2020-02-28 PROCEDURE — 1101F PT FALLS ASSESS-DOCD LE1/YR: CPT | Mod: CPTII,S$GLB,, | Performed by: INTERNAL MEDICINE

## 2020-02-28 PROCEDURE — 99499 RISK ADDL DX/OHS AUDIT: ICD-10-PCS | Mod: S$GLB,,, | Performed by: INTERNAL MEDICINE

## 2020-02-28 PROCEDURE — 99214 PR OFFICE/OUTPT VISIT, EST, LEVL IV, 30-39 MIN: ICD-10-PCS | Mod: S$GLB,,, | Performed by: INTERNAL MEDICINE

## 2020-02-28 PROCEDURE — 1125F PR PAIN SEVERITY QUANTIFIED, PAIN PRESENT: ICD-10-PCS | Mod: S$GLB,,, | Performed by: INTERNAL MEDICINE

## 2020-02-28 PROCEDURE — 1125F AMNT PAIN NOTED PAIN PRSNT: CPT | Mod: S$GLB,,, | Performed by: INTERNAL MEDICINE

## 2020-02-28 PROCEDURE — 99999 PR PBB SHADOW E&M-EST. PATIENT-LVL III: ICD-10-PCS | Mod: PBBFAC,,, | Performed by: INTERNAL MEDICINE

## 2020-02-28 PROCEDURE — 99999 PR PBB SHADOW E&M-EST. PATIENT-LVL III: CPT | Mod: PBBFAC,,, | Performed by: INTERNAL MEDICINE

## 2020-02-28 PROCEDURE — 99214 OFFICE O/P EST MOD 30 MIN: CPT | Mod: S$GLB,,, | Performed by: INTERNAL MEDICINE

## 2020-02-28 PROCEDURE — 1159F PR MEDICATION LIST DOCUMENTED IN MEDICAL RECORD: ICD-10-PCS | Mod: S$GLB,,, | Performed by: INTERNAL MEDICINE

## 2020-02-28 RX ORDER — MIRTAZAPINE 7.5 MG/1
7.5 TABLET, FILM COATED ORAL NIGHTLY
Qty: 30 TABLET | Refills: 6 | Status: SHIPPED | OUTPATIENT
Start: 2020-02-28 | End: 2020-10-11 | Stop reason: ALTCHOICE

## 2020-02-28 RX ORDER — OXYCODONE AND ACETAMINOPHEN 2.5; 325 MG/1; MG/1
1 TABLET ORAL EVERY 6 HOURS PRN
Qty: 30 TABLET | Refills: 0 | Status: SHIPPED | OUTPATIENT
Start: 2020-02-28 | End: 2020-02-28

## 2020-02-28 RX ORDER — OXYCODONE AND ACETAMINOPHEN 5; 325 MG/1; MG/1
TABLET ORAL
Qty: 15 TABLET | Refills: 0 | Status: SHIPPED | OUTPATIENT
Start: 2020-02-28 | End: 2020-03-10 | Stop reason: SDUPTHER

## 2020-02-28 NOTE — PROGRESS NOTES
Assessment and Plan:    1. Brain tumor  - oxyCODONE-acetaminophen (PERCOCET) 2.5-325 mg per tablet; Take 1 tablet by mouth every 6 (six) hours as needed for Pain.  Dispense: 30 tablet; Refill: 0  2. Cancer related pain  - oxyCODONE-acetaminophen (PERCOCET) 2.5-325 mg per tablet; Take 1 tablet by mouth every 6 (six) hours as needed for Pain.  Dispense: 30 tablet; Refill: 0  3. Squamous cell carcinoma of cheek    Planning to start radiation treatment to decrease risk of spread to contralateral optic nerve and for palliation. Not intended to be curative. Patient is having some pain (facial and headache) and is concerned about the pain from the treatment. We discussed trying low dose percocet to see if this helps with pain but does not cause the fatigue she is getting from tramadol. Also advised to discuss with Dr. Vance to see if he has any additional recommendations for pain control.     4. Decreased appetite  - mirtazapine (REMERON) 7.5 MG Tab; Take 1 tablet (7.5 mg total) by mouth every evening.  Dispense: 30 tablet; Refill: 6  5. Insomnia, unspecified type  - mirtazapine (REMERON) 7.5 MG Tab; Take 1 tablet (7.5 mg total) by mouth every evening.  Dispense: 30 tablet; Refill: 6    Will start trial of mirtazapine for sleep and appetite.     ______________________________________________________________________  Subjective:    Chief Complaint:  Follow up chronic medical conditions.    HPI:  Summer is a 87 y.o. year old female here to follow up chronic medical conditions.     Since I had last seen her, she has been seen by Neurosurgery, Dermatology, and Radiation Oncology. At this point, the thought is that the brain mass is likely related to the SCC on left cheek. She is currently seeing Dr. Vance at [facility] for radiation treatments.     She barajas continued to see Dr. Oropeza for venous stasis ulcer of left lower leg, but was discharged from wound clinic yesterday as the wound has healed.     She  continues to take gabapentin 200 mg nightly for neuropathic pain this has been helpful. Magic mouthwash PRN for mouth sores, also has been helpful. She was prescribed tramadol PRN by neurosurgery, but has not been taking this as she reports it makes her too sleepy. She wanted to discuss alternatives.     Appetite- Had been on megace previously. Last visit had reported that she was not sure if this was helping. She reports today that her appetite has still been poor. She also notes that she has not been sleeping well. Wakes up a lot through the night.     Medications:  Current Outpatient Medications on File Prior to Visit   Medication Sig Dispense Refill    (Magic mouthwash) 1:1:1 Benadryl 12.5mg/5ml liq, aluminum & magnesium hydroxide-simehticone (Maalox), lidocaine viscous 2% Swish and spit 5 mLs every 4 (four) hours as needed. for mouth sores 360 mL 0    acetaminophen (TYLENOL ORAL) Take 1 Dose by mouth 2 (two) times daily as needed.      dorzolamide (TRUSOPT) 2 % ophthalmic solution   6    gabapentin (NEURONTIN) 100 MG capsule Take 2 capsules (200 mg total) by mouth every evening. 180 capsule 1    LUMIGAN 0.01 % Drop       multivitamin (THERAGRAN) per tablet Take 1 tablet by mouth once daily.      mupirocin (BACTROBAN) 2 % ointment Apply topically 3 (three) times daily.      timolol maleate 0.5% (TIMOPTIC) 0.5 % Drop   6    traMADol (ULTRAM) 50 mg tablet Take 1 tablet (50 mg total) by mouth every 6 (six) hours as needed for Pain. 30 tablet 1     No current facility-administered medications on file prior to visit.        Review of Systems:  Review of Systems   Constitutional: Positive for appetite change. Negative for activity change.   Respiratory: Negative for chest tightness and shortness of breath.    Cardiovascular: Negative for chest pain and leg swelling (improved).   Skin: Negative for wound (all healed).   Neurological: Positive for headaches. Negative for syncope, weakness and numbness.  "      Past Medical History:  Past Medical History:   Diagnosis Date    Cancer     skin cancer       Objective:    Vitals:  Vitals:    02/28/20 1345   BP: (!) 148/80   Pulse: 78   Resp: 18   SpO2: 98%   Weight: 44.7 kg (98 lb 8.7 oz)   Height: 5' 3" (1.6 m)   PainSc:   4   PainLoc: Eye       Physical Exam   Constitutional: She is oriented to person, place, and time. She appears well-developed and well-nourished. No distress.   no longer in wheelchair, walking with cane   HENT:   Mouth/Throat: Oropharynx is clear and moist.   Eyes:   left eye has complete ptosis (unable to hold eyelid open at all)   Cardiovascular: Normal rate and intact distal pulses.   Murmur: faint systolic murmur at base of heart.  predominantly regular rate with some irregular beats   Pulmonary/Chest: Effort normal and breath sounds normal. No respiratory distress. She has no wheezes. She has no rales.   Musculoskeletal: She exhibits no edema.   Neurological: She is alert and oriented to person, place, and time.   Skin: Skin is warm and dry. She is not diaphoretic.   left central cheek has ulcerated lesion similar to before   Psychiatric: She has a normal mood and affect. Her behavior is normal. Judgment and thought content normal.       Data:    MRI Brain  Impression       Continued but increased abnormal enhancement left pre maxillary soft tissues with overlying defect within the superficial soft tissues concerning for ulceration with diffuse enhancement within the left infraorbital foramen extending to the pterygopalatine fossa.    Continue though slightly increased sized enhancement centered in the left Meckel's cave extending along the left foramen ovale into the infratemporal fossa with diffuse enhancing material within left pterygopalatine fossa extending to the infratemporal fossa and cranially to the posterior orbit with slight increased component of enhancement within the left orbit.  Continued enhancement left foramen rotundum and " vidian canals.  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  Alternative differential including inflammatory/infectious process felt less likely.    Clinical correlation and follow-up advised    Continued left eustachian tube dysfunction with diffuse fluid opacification left mastoid air cells.    No evidence for acute infarction or significant new parenchymal signal abnormality throughout the brain.        Kayla Pereira MD  Internal Medicine

## 2020-02-28 NOTE — TELEPHONE ENCOUNTER
----- Message from Danielle Magallanes sent at 2/28/2020  2:34 PM CST -----  Contact: [facility]  Type: Needs Medical Advice  Who Called:  maris  Pharmacy name and phone #:    [facility] - [place] - [address]  [place] [address]  Phone: [phone] Fax: [phone]  Best Call Back Number: see above  Additional Information: Maris states  oxyCODONE-acetaminophen (PERCOCET) 2.5-325 mg per tablet, they do not order 2.5-325 they do however have 5.0-325 can they fill this for her but if so they would need a new Rx.  Please call to advise. Thanks!

## 2020-03-08 ENCOUNTER — PATIENT MESSAGE (OUTPATIENT)
Dept: FAMILY MEDICINE | Facility: CLINIC | Age: 85
End: 2020-03-08

## 2020-03-09 ENCOUNTER — TELEPHONE (OUTPATIENT)
Dept: FAMILY MEDICINE | Facility: CLINIC | Age: 85
End: 2020-03-09

## 2020-03-09 DIAGNOSIS — D49.6 BRAIN TUMOR: ICD-10-CM

## 2020-03-09 DIAGNOSIS — G89.3 CANCER RELATED PAIN: ICD-10-CM

## 2020-03-09 NOTE — TELEPHONE ENCOUNTER
Spoke with pt's daughter, she states her mother has had a decrease in appetite and her pain level is not being well managed. She is not ure if they need a different appetite stimulant or should increase her pain medication. Even after taking the percocet, she is rating her pain a 7/10. She is down to 95 lbs.

## 2020-03-09 NOTE — TELEPHONE ENCOUNTER
She is taking 1/2 tablet of percocet, they will give mirtazapine a couple more weeks. She is willing to try the care at home/palliative care but was hesitant at first as she is not sure it is necessary at this point

## 2020-03-09 NOTE — TELEPHONE ENCOUNTER
----- Message from Roz Morillo sent at 3/9/2020  1:27 PM CDT -----      Type: Needs Medical Advice  Who Called: pt  Daughter    julia  Pharmacy name and phone #:   [facility] - [place] - [address]  [phone]  [place] [address]  Phone: [phone] Fax: [phone]  Best Call Back Number: [phone]  Additional Information:   Pt  Daughter  Is  Calling to  Discuss  An   Med to increase  Pt  Appetite  Please call

## 2020-03-10 ENCOUNTER — PATIENT MESSAGE (OUTPATIENT)
Dept: FAMILY MEDICINE | Facility: CLINIC | Age: 85
End: 2020-03-10

## 2020-03-10 DIAGNOSIS — M79.2 NEUROPATHIC PAIN: ICD-10-CM

## 2020-03-10 DIAGNOSIS — D49.6 BRAIN TUMOR: Primary | ICD-10-CM

## 2020-03-10 DIAGNOSIS — R63.0 DECREASED APPETITE: ICD-10-CM

## 2020-03-10 DIAGNOSIS — G89.3 CANCER RELATED PAIN: ICD-10-CM

## 2020-03-10 RX ORDER — OXYCODONE AND ACETAMINOPHEN 5; 325 MG/1; MG/1
1 TABLET ORAL EVERY 6 HOURS PRN
Qty: 30 TABLET | Refills: 0 | Status: SHIPPED | OUTPATIENT
Start: 2020-03-10 | End: 2020-03-31 | Stop reason: SDUPTHER

## 2020-03-10 NOTE — TELEPHONE ENCOUNTER
OK to go up to full pill of the percocet, I will send a new Rx. Please monitor for any signs of sedation or confusion on this dose. Let me know if she would like me to place the referral for palliative care if things are getting worse.

## 2020-03-18 ENCOUNTER — TELEPHONE (OUTPATIENT)
Dept: HEMATOLOGY/ONCOLOGY | Facility: CLINIC | Age: 85
End: 2020-03-18

## 2020-03-18 NOTE — TELEPHONE ENCOUNTER
Spoke with daughter.  At this time the family has decided that they are not moving forward with treatment.  They have great concerns regarding her quality of life and feel that a better quality of life out weighs treatment at this time.  Name and phone number provided to daughter to call us if they changed their mind or needed to speak with Dr. Benito for more information.  Daughter stated that they were seeking palliative care at this time and would progress to hospice if her mother did not improve her eating.  Again, encourage the daughter to call with any questions or concerns.  Daughter verbalized understanding.  Dr. Benito aware and she will contact Dr. Vance

## 2020-03-19 ENCOUNTER — CARE AT HOME (OUTPATIENT)
Dept: HOME HEALTH SERVICES | Facility: CLINIC | Age: 85
End: 2020-03-19
Payer: MEDICARE

## 2020-03-19 VITALS
OXYGEN SATURATION: 98 % | RESPIRATION RATE: 16 BRPM | HEART RATE: 112 BPM | SYSTOLIC BLOOD PRESSURE: 120 MMHG | DIASTOLIC BLOOD PRESSURE: 60 MMHG | WEIGHT: 93 LBS | BODY MASS INDEX: 16.47 KG/M2 | TEMPERATURE: 98 F

## 2020-03-19 DIAGNOSIS — Z51.5 ENCOUNTER FOR PALLIATIVE CARE: Primary | ICD-10-CM

## 2020-03-19 DIAGNOSIS — G89.3 CANCER RELATED PAIN: ICD-10-CM

## 2020-03-19 DIAGNOSIS — R63.4 WEIGHT LOSS: ICD-10-CM

## 2020-03-19 DIAGNOSIS — R63.0 DECREASED APPETITE: ICD-10-CM

## 2020-03-19 DIAGNOSIS — M79.2 NEUROPATHIC PAIN: ICD-10-CM

## 2020-03-19 DIAGNOSIS — D49.6 BRAIN TUMOR: ICD-10-CM

## 2020-03-19 DIAGNOSIS — C44.329 SQUAMOUS CELL CARCINOMA OF CHEEK: ICD-10-CM

## 2020-03-19 DIAGNOSIS — Z71.89 COUNSELING REGARDING ADVANCED CARE PLANNING AND GOALS OF CARE: ICD-10-CM

## 2020-03-19 DIAGNOSIS — R53.81 DEBILITY: ICD-10-CM

## 2020-03-19 PROCEDURE — 1125F PR PAIN SEVERITY QUANTIFIED, PAIN PRESENT: ICD-10-PCS | Mod: S$GLB,,, | Performed by: NURSE PRACTITIONER

## 2020-03-19 PROCEDURE — 1101F PR PT FALLS ASSESS DOC 0-1 FALLS W/OUT INJ PAST YR: ICD-10-PCS | Mod: CPTII,S$GLB,, | Performed by: NURSE PRACTITIONER

## 2020-03-19 PROCEDURE — 1125F AMNT PAIN NOTED PAIN PRSNT: CPT | Mod: S$GLB,,, | Performed by: NURSE PRACTITIONER

## 2020-03-19 PROCEDURE — 1159F PR MEDICATION LIST DOCUMENTED IN MEDICAL RECORD: ICD-10-PCS | Mod: S$GLB,,, | Performed by: NURSE PRACTITIONER

## 2020-03-19 PROCEDURE — 1159F MED LIST DOCD IN RCRD: CPT | Mod: S$GLB,,, | Performed by: NURSE PRACTITIONER

## 2020-03-19 PROCEDURE — 99349 PR HOME VISIT,ESTAB PATIENT,LEVEL III: ICD-10-PCS | Mod: S$GLB,,, | Performed by: NURSE PRACTITIONER

## 2020-03-19 PROCEDURE — 99349 HOME/RES VST EST MOD MDM 40: CPT | Mod: S$GLB,,, | Performed by: NURSE PRACTITIONER

## 2020-03-19 PROCEDURE — 1101F PT FALLS ASSESS-DOCD LE1/YR: CPT | Mod: CPTII,S$GLB,, | Performed by: NURSE PRACTITIONER

## 2020-03-19 NOTE — PROGRESS NOTES
"[facility]  Palliative Care Home Visit    Visit Date: 3/20/2020  Encounter Provider: Gi Crow NP  PCP:  Kayla Pereira MD    Subjective:      Patient ID: Summer Ch is a 87 y.o. female.    Consult Requested By:  Dr. 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     Summer is a retired . She is  and lives alone. She has 3 children, Delicia, Stefanie and Chapito, all of whom live nearby and assist her with what is needed. They are present at all MD appointments.    She is being seen today to establish Palliative Care. Her daughter Delicia is present today for this visit as well. Summer is AAOx3, very pleasant and participates in this visit. She reports that today she will have her 12th of 15 radiation therapy treatments for a brain tumor. She reports chronic headaches and is taking Percocet daily. Over the last 2 weeks she has had to increase from 1/2 Percocet to taking 1 full Percocet for pain relief. She reports that she may have to start taking one in the mornings when she wakes up because her head hurts "pretty badly" every morning.    Patient reports chronic constipation and was recommended to start using Miralax. She also reports decreased appetite with weight loss. Her weight is reported to be 93 lbs today and in December 2019 weight was 112 lbs. She has been on Megace but it was not helping. She reports chronic fatigue since beginning radiation therapy as well as not sleeping well. She was recently started on Mirtazapine and is sleeping better but appetite has not improved. Encouraged to drink 1-2 cans of Boost or get some Hazel Instant Breakfast to drink daily.     Both patient and her daughter express that they would like to conserve the patient's quality of life and are " "afraid that extensive therapy would degrade her current status. When she is done with radiation therapy on 3/23/2020 they would like to take a "wait and see approach" and let her heal. Delicia stated that Immunotherapy was recommended but that she researched it and the side effects would be too much for the patient to handle. Summer is in agreement with this today.     Goals of care  Patient does not have any Advanced Directives completed at this time. We discussed at length what options she has and she did state that she would want to be a DNR. Delicia wanted me to leave the forms with her to review with her other siblings and they will fill out and return to me or PCP. Comfort care was verbalized as desired but not filled out on form yet.    Instructions:  1. Palliative Care follow-up in 4 weeks.  2. Continue all medications.  3. Fall precautions at all times.  4. If symptoms worsen, call 911 or go to ED. Call Palliative care NP.   5. Please review and complete Advanced Directives forms left at home.      Review of Systems   Constitutional: Positive for appetite change, fatigue and unexpected weight change. Negative for chills.   HENT: Negative.    Eyes: Negative.    Respiratory: Negative.    Cardiovascular: Negative.    Gastrointestinal: Positive for constipation. Negative for abdominal distention, abdominal pain, diarrhea, nausea and vomiting.   Endocrine: Negative.    Genitourinary: Negative.    Musculoskeletal: Positive for gait problem (unsteady at times ).   Skin:        Left face with ulceration from squamous cell ca and current radiation therapy.   Neurological: Positive for weakness and headaches. Negative for dizziness and seizures.   Hematological: Bruises/bleeds easily.   Psychiatric/Behavioral: Negative.        Assessments:  · Environmental: clean, adequate lighting and temperature, no foul odors, lives alone children are with her daily.  · Functional Status: independent, uses cane, needs help " with housework  · Safety: lives alone, home alone at night, chronic pain  · Nutritional: adequate access but poor appetite and weight loss  · Home Health/DME/Supplies: No current Home Health, receiving Radiation Therapy daily until Monday, uses cane for ambulation    Symptom Assessment (ESAS 0-10 scale)     ESAS 0 1 2 3 4 5 6 7 8 9 10   Pain        x      Dyspnea x             Anxiety x             Nausea x             Depression   x            Anorexia     x         Fatigue   x           Insomnia   x           Restlessness  x             Agitation x               Constipation    yes  Bowel Management Plan (BMP): yes  Diarrhea        no  Comments: uses MOM, will start Miralax    Performance Status: PPS Score 60  Karnofsky Score:  60  EGOC:  2    History:  Past Medical History:   Diagnosis Date    Cancer     skin cancer     Family History   Problem Relation Age of Onset    Alcohol abuse Mother     Stroke Father         Hemorrhagic     Past Surgical History:   Procedure Laterality Date    APPENDECTOMY      HYSTERECTOMY      SKIN CANCER EXCISION      x5    TONSILLECTOMY       Review of patient's allergies indicates:  No Known Allergies    Medications:    Current Outpatient Medications:     (Magic mouthwash) 1:1:1 Benadryl 12.5mg/5ml liq, aluminum & magnesium hydroxide-simehticone (Maalox), lidocaine viscous 2%, Swish and spit 5 mLs every 4 (four) hours as needed. for mouth sores, Disp: 360 mL, Rfl: 0    acetaminophen (TYLENOL ORAL), Take 1 Dose by mouth 2 (two) times daily as needed., Disp: , Rfl:     dorzolamide (TRUSOPT) 2 % ophthalmic solution, , Disp: , Rfl: 6    gabapentin (NEURONTIN) 100 MG capsule, Take 2 capsules (200 mg total) by mouth every evening., Disp: 180 capsule, Rfl: 1    LUMIGAN 0.01 % Drop, , Disp: , Rfl:     mirtazapine (REMERON) 7.5 MG Tab, Take 1 tablet (7.5 mg total) by mouth every evening., Disp: 30 tablet, Rfl: 6    multivitamin (THERAGRAN) per tablet, Take 1 tablet by mouth once  daily., Disp: , Rfl:     oxyCODONE-acetaminophen (PERCOCET) 5-325 mg per tablet, Take 1 tablet by mouth every 6 (six) hours as needed for Pain. Take 0.5 tablets Q6 hours PRN pain, Disp: 30 tablet, Rfl: 0    timolol maleate 0.5% (TIMOPTIC) 0.5 % Drop, , Disp: , Rfl: 6    mupirocin (BACTROBAN) 2 % ointment, Apply topically 3 (three) times daily., Disp: , Rfl:     traMADol (ULTRAM) 50 mg tablet, Take 1 tablet (50 mg total) by mouth every 6 (six) hours as needed for Pain. (Patient not taking: Reported on 3/19/2020), Disp: 30 tablet, Rfl: 1    24h Oral Morphine Equivalents (OME):  N/A    Objective:     Physical Exam:  Vitals:    03/19/20 1317   BP: 120/60   Pulse: (!) 112   Resp: 16   Temp: 98.2 °F (36.8 °C)   TempSrc: Temporal   SpO2: 98%   Weight: 42.2 kg (93 lb)   PainSc:   3   PainLoc: Head     Body mass index is 16.47 kg/m².    Physical Exam   Constitutional: She is oriented to person, place, and time. She appears well-developed and well-nourished yet thin and frail. No distress. Walking with a cane.  HENT:   Mouth/Throat: Oropharynx is clear and moist.   Eyes:   left eye has complete ptosis (unable to hold eyelid open at all) , wearing eye patch  Cardiovascular: Normal rate and intact distal pulses.   Murmur: faint systolic murmur at base of heart.  predominantly regular rate with some irregular beats   Pulmonary/Chest: Effort normal and breath sounds normal. No respiratory distress. She has no wheezes. She has no rales.   Musculoskeletal: She exhibits no edema.   Neurological: She is alert and oriented to person, place, and time.   Skin: Skin is warm and dry. She is not diaphoretic.   left central cheek has ulcerated lesion   Psychiatric: She has a normal mood and affect. Her behavior is normal. Judgment and thought content normal.     Labs:  CBC:   WBC   Date Value Ref Range Status   12/31/2019 9.16 3.90 - 12.70 K/uL Final      82 -  Hemoglobin   Date Value Ref Range Status   12/31/2019 12.6 12.0 - 16.0 g/dL  Final       Hematocrit   Date Value Ref Range Status   12/31/2019 41.5 37.0 - 48.5 % Final   20  Mean Corpuscular Volume   Date Value Ref Range Status   12/31/2019 102 (H) 82 - 98 fL Final   19    BILITOT 0.4 12/31/2019       Albumin:   Albumin   Date Value Ref Range Status   12/31/2019 3.9 3.5 - 5.2 g/dL Final     Protein:   Total Protein   Date Value Ref Range Status   12/31/2019 7.1 6.0 - 8.4 g/dL Final       Radiology:  I have reviewed all pertinent imaging results/findings within the past 24 hours.  IMAGING:  MRI Brain W WO Contrast (02/04/2020) shows an expansile lesion involving the infratemporal fossa on the left, the cavernous sinus, and the orbit.      NM PET CT Whole Body (02/04/2020) shows physiologic uptake of the tracer is present within the brain, salivary glands, myocardium, GI and  tracts.        Psychosocial/Cultural/Spiritual:   · F- Zee and Belief:  Rastafari   · I - Importance: very important  · C - [facility]: [facility]  · A - Address in Care: no issues at this time      Assessment:     1. Encounter for palliative care    2. Brain tumor    3. Cancer related pain    4. Neuropathic pain    5. Decreased appetite    6. Squamous cell carcinoma of cheek    7. Debility    8. Weight loss        Plan:     Ethical / Legal: Advance Care Planning   · Surrogate decision maker:  Roney Nesbitt, Relationship: daughter  · Code Status: DNR  · [place]:  Form left with patient and daughter for review, they would like to discuss it with other children  · Other advance directive:  none, Capacity to make medical decisions:  intact, Conflict: none       Summer was seen today for [facility] care.    Diagnoses and all orders for this visit:    Encounter for palliative care    Brain tumor    Cancer related pain    Neuropathic pain    Decreased appetite    Squamous cell carcinoma of cheek    Debility    Weight loss    Advanced Care counseling    Were controlled substances prescribed?  No    > 50% of 75 min  visit spent in chart review, face to face discussion of goals of care,  symptom assessment, coordination of care and emotional support.    Follow Up Appointments:   Future Appointments   Date Time Provider Department Center   7/24/2020  9:45 AM Antonio Sears MD [facility] [place]   Consent for visit obtained from patient today.    Signature:  Gi Crow NP     Attestation:   Screening criteria to assess the level of the patient's risk for infection with COVID-19 as recommended by the CDC at the time of the above documented home visit concluded appropriateness to proceed. Universal precautions were maintained at all times, including provider use of 60% alcohol gel hand  immediately prior to entry and upon departure of patient's home as well as cleaning of equipment used in home visit with antibacterial/germicidal disposable wipes.

## 2020-03-20 PROBLEM — R63.4 WEIGHT LOSS: Status: ACTIVE | Noted: 2020-03-20

## 2020-03-20 PROBLEM — Z51.5 ENCOUNTER FOR PALLIATIVE CARE: Status: ACTIVE | Noted: 2020-03-20

## 2020-03-20 PROBLEM — R53.81 DEBILITY: Status: ACTIVE | Noted: 2020-03-20

## 2020-03-20 PROBLEM — Z71.89 COUNSELING REGARDING ADVANCED CARE PLANNING AND GOALS OF CARE: Status: ACTIVE | Noted: 2020-03-20

## 2020-03-30 DIAGNOSIS — D49.6 BRAIN TUMOR: ICD-10-CM

## 2020-03-30 DIAGNOSIS — G89.3 CANCER RELATED PAIN: ICD-10-CM

## 2020-03-30 NOTE — TELEPHONE ENCOUNTER
Please call patient to get update on how often she has been needing and taking the percocet, and how this is helping. I want to send correct amount this time.

## 2020-03-31 ENCOUNTER — PATIENT MESSAGE (OUTPATIENT)
Dept: FAMILY MEDICINE | Facility: CLINIC | Age: 85
End: 2020-03-31

## 2020-03-31 DIAGNOSIS — D49.6 BRAIN TUMOR: ICD-10-CM

## 2020-03-31 DIAGNOSIS — G89.3 CANCER RELATED PAIN: ICD-10-CM

## 2020-03-31 RX ORDER — OXYCODONE AND ACETAMINOPHEN 5; 325 MG/1; MG/1
TABLET ORAL
Qty: 30 TABLET | Refills: 0 | Status: SHIPPED | OUTPATIENT
Start: 2020-03-31 | End: 2020-04-20 | Stop reason: SDUPTHER

## 2020-03-31 RX ORDER — OXYCODONE AND ACETAMINOPHEN 5; 325 MG/1; MG/1
1 TABLET ORAL EVERY 6 HOURS PRN
Qty: 30 TABLET | Refills: 0 | Status: CANCELLED | OUTPATIENT
Start: 2020-03-31

## 2020-03-31 NOTE — TELEPHONE ENCOUNTER
Spoke with daughter and she states that she will call her mom and verify dosage and send message back through patient portal.

## 2020-03-31 NOTE — TELEPHONE ENCOUNTER
I had sent a message yesterday asking someone to call the patient and see how often she has been needing and taking this, please call patient before I can send refill. No one has addressed this message yet.

## 2020-04-01 ENCOUNTER — PATIENT MESSAGE (OUTPATIENT)
Dept: FAMILY MEDICINE | Facility: CLINIC | Age: 85
End: 2020-04-01

## 2020-04-01 RX ORDER — OXYCODONE AND ACETAMINOPHEN 5; 325 MG/1; MG/1
TABLET ORAL
Qty: 30 TABLET | Refills: 0 | OUTPATIENT
Start: 2020-04-01

## 2020-04-01 NOTE — TELEPHONE ENCOUNTER
Called patient in regards to prescription clarification. No answer. LVM to return phone call to clinic.

## 2020-04-01 NOTE — TELEPHONE ENCOUNTER
----- Message from Roz Morillo sent at 4/1/2020  8:24 AM CDT -----    Type:  Patient Returning Call    Who Called:  Pt  Daughter cat  Who Left Message for Patient:   quentin  Does the patient know what this is regarding?:   About a  script  Best Call Back Number: [phone]  Additional Information:   Calling  Back  Placed a call to pod

## 2020-04-16 DIAGNOSIS — D49.6 BRAIN TUMOR: ICD-10-CM

## 2020-04-16 DIAGNOSIS — G89.3 CANCER RELATED PAIN: ICD-10-CM

## 2020-04-16 RX ORDER — OXYCODONE AND ACETAMINOPHEN 5; 325 MG/1; MG/1
TABLET ORAL
Qty: 30 TABLET | Refills: 0 | Status: CANCELLED | OUTPATIENT
Start: 2020-04-16

## 2020-04-16 NOTE — TELEPHONE ENCOUNTER
Please call to clarify. She had told me patient was taking 1/2 pill once per day, so the 30 pills she was sent on 3/31 should have lasted 2 months, and it has only been 2 weeks. By this account she is taking about 4 times as many as we had discussed. As this is a controlled substance, we can not be filling it randomly at various times. We have to fill this no more frequently than once per month. I need to know for real how much she is actually needing. I can send a refill this one last time for the correct amount, but then she is not to take more than what we had discussed without telling me. I can not issue more than one prescription in a month going forward. She has used 60 pills in 5 weeks. I want to make sure that her pain is controlled, but I also want to make sure that no one else is taking these pills.  If the patient is really only taking 1/2 pill nightly, then something else is happening to the rest of the pills and that is not something that I am comfortable refilling.

## 2020-04-20 ENCOUNTER — PATIENT MESSAGE (OUTPATIENT)
Dept: FAMILY MEDICINE | Facility: CLINIC | Age: 85
End: 2020-04-20

## 2020-04-20 DIAGNOSIS — D49.6 BRAIN TUMOR: ICD-10-CM

## 2020-04-20 DIAGNOSIS — G89.3 CANCER RELATED PAIN: ICD-10-CM

## 2020-04-20 RX ORDER — OXYCODONE AND ACETAMINOPHEN 5; 325 MG/1; MG/1
TABLET ORAL
Qty: 30 TABLET | Refills: 0 | Status: SHIPPED | OUTPATIENT
Start: 2020-04-27 | End: 2020-10-11 | Stop reason: ALTCHOICE

## 2020-04-23 ENCOUNTER — CARE AT HOME (OUTPATIENT)
Dept: HOME HEALTH SERVICES | Facility: CLINIC | Age: 85
End: 2020-04-23
Payer: MEDICARE

## 2020-04-23 VITALS
DIASTOLIC BLOOD PRESSURE: 68 MMHG | HEART RATE: 76 BPM | SYSTOLIC BLOOD PRESSURE: 102 MMHG | OXYGEN SATURATION: 99 % | TEMPERATURE: 99 F | RESPIRATION RATE: 16 BRPM

## 2020-04-23 DIAGNOSIS — C44.329 SQUAMOUS CELL CARCINOMA OF CHEEK: ICD-10-CM

## 2020-04-23 DIAGNOSIS — I87.2 EDEMA OF BOTH LOWER LEGS DUE TO PERIPHERAL VENOUS INSUFFICIENCY: ICD-10-CM

## 2020-04-23 DIAGNOSIS — D49.6 BRAIN TUMOR: ICD-10-CM

## 2020-04-23 DIAGNOSIS — R60.0 EDEMA OF BOTH LOWER LEGS DUE TO PERIPHERAL VENOUS INSUFFICIENCY: ICD-10-CM

## 2020-04-23 DIAGNOSIS — Z51.5 ENCOUNTER FOR PALLIATIVE CARE: Primary | ICD-10-CM

## 2020-04-23 PROCEDURE — 1126F PR PAIN SEVERITY QUANTIFIED, NO PAIN PRESENT: ICD-10-PCS | Mod: S$GLB,,, | Performed by: NURSE PRACTITIONER

## 2020-04-23 PROCEDURE — 1159F PR MEDICATION LIST DOCUMENTED IN MEDICAL RECORD: ICD-10-PCS | Mod: S$GLB,,, | Performed by: NURSE PRACTITIONER

## 2020-04-23 PROCEDURE — 1126F AMNT PAIN NOTED NONE PRSNT: CPT | Mod: S$GLB,,, | Performed by: NURSE PRACTITIONER

## 2020-04-23 PROCEDURE — 1101F PR PT FALLS ASSESS DOC 0-1 FALLS W/OUT INJ PAST YR: ICD-10-PCS | Mod: CPTII,S$GLB,, | Performed by: NURSE PRACTITIONER

## 2020-04-23 PROCEDURE — 1101F PT FALLS ASSESS-DOCD LE1/YR: CPT | Mod: CPTII,S$GLB,, | Performed by: NURSE PRACTITIONER

## 2020-04-23 PROCEDURE — 1159F MED LIST DOCD IN RCRD: CPT | Mod: S$GLB,,, | Performed by: NURSE PRACTITIONER

## 2020-04-23 PROCEDURE — 99348 PR HOME VISIT,ESTAB PATIENT,LEVEL II: ICD-10-PCS | Mod: S$GLB,,, | Performed by: NURSE PRACTITIONER

## 2020-04-23 PROCEDURE — 99348 HOME/RES VST EST LOW MDM 30: CPT | Mod: S$GLB,,, | Performed by: NURSE PRACTITIONER

## 2020-04-23 NOTE — PROGRESS NOTES
"[facility]  Palliative Care Home Visit    Visit Date: 4/23/2020  Encounter Provider: Gi Crow NP  PCP:  Kayla Pereira MD    Subjective:      Patient ID: Summer Ch is a 87 y.o. female.    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      She is being seen today at her home with her daughter Diana present for a Palliative Care follow up visit.  Summer is AAOx3, very pleasant and participates in this visit. She reports that she completed radiation therapy 1 month ago and is feeling "great". She reports that her appetite and energy has returned. She is doing some cooking now but is staying home due to COVID-19 in the community. She also reports that although her head pain had been occurring daily and she was taking pain medication frequently, she has not had any pain since Sunday, 4 days ago, and has not taken any pain medication since then.     Diana reports that her mother manages her own medications and thought that she understood how to take her pain medications but patient was running out of Oxycodone sooner than she should have and it seems that she may have been taking them incorrectly. Her PCP is involved and patient seems to have a better understanding of how to take her pain medication now.      Summer does not have a scale in her home so we are unable to weigh her but she reports that her appetite has increased tremendously since being finished with radiation therapy and thinks that her weight is up. She has stopped taking Mirtazapine because of how much better she is eating and reports she is sleeping better as well.    Both legs are swollen today and Diana expresses that the patient is doing too much and not elevating her feet. Summer has a history of venous " "stasis ulcerations. Encouraged patient to stay off of her feet as much as possible, wear compression hoses which she has and monitor closely for breakdown.     Summer and Diana still express that they would like to conserve the patient's quality of life and are afraid that extensive therapy would degrade her current status. They want to continue with a "wait and see approach" and let her heal from radiation therapy. Delicia stated that Immunotherapy was recommended but that she researched it and the side effects would be too much for the patient to handle. Summer is in agreement with this today. They are going to make an appointment with a Heme/Onc MD in the next week just to have another opinion.       Goals of care  Patient does not have any Advanced Directives completed at this time. We discussed at length what options she has and she did state that she would want to be a DNR. Delicia has not been able to discuss or review Advanced Directives yet with other siblings. Comfort care was verbalized as desired but not filled out on form yet.     Instructions:  1. Palliative Care follow-up in 4-6 weeks.  2. Continue all medications.  3. Fall precautions at all times.  4. If symptoms worsen, call 911 or go to ED. Call Palliative care NP.   5. Please review and complete Advanced Directives forms left at home and return to NP or PCP office.  6. Wear compression stockings daily, elevate feet while seated, stay off of feet as much as possible, limit salt intake.        Review of Systems   Constitutional: Positive for appetite change, fatigue and unexpected weight change (all improved from last visit). Negative for chills.   HENT: Negative.    Eyes: Negative.  Wearing eye patch on left eye.  Respiratory: Negative.    Cardiovascular: Negative.    Gastrointestinal: Positive for constipation. Negative for abdominal distention, abdominal pain, diarrhea, nausea and vomiting.   Endocrine: Negative.    Genitourinary: " Negative.    Musculoskeletal: Positive for gait problem (unsteady at times ).   Skin:        Left face with ulceration from squamous cell ca and current radiation therapy.   Neurological: Negative for weakness and headaches. Negative for dizziness and seizures.   Hematological: Bruises/bleeds easily.   Psychiatric/Behavioral: Negative.          Assessments:  · Environmental: clean, adequate lighting and temperature, no foul odors, lives alone children are with her daily.  · Functional Status: independent, uses cane, needs help with housework  · Safety: lives alone, home alone at night  · Nutritional: adequate access, appetite improving  · Home Health/DME/Supplies: No current Home Health, uses cane for ambulation         Symptom Assessment (ESAS 0-10 scale)     ESAS 0 1 2 3 4 5 6 7 8 9 10   Pain x             Dyspnea x             Anxiety x             Nausea x             Depression  x             Anorexia x             Fatigue  x            Insomnia x             Restlessness  x             Agitation x               Constipation    yes  Bowel Management Plan (BMP): yes  Diarrhea        no  Comments: uses MOM, will start Miralax     Performance Status: PPS Score 60  Karnofsky Score:  60  EGOC:  2    History:  Past Medical History:   Diagnosis Date    Cancer     skin cancer     Family History   Problem Relation Age of Onset    Alcohol abuse Mother     Stroke Father         Hemorrhagic     Past Surgical History:   Procedure Laterality Date    APPENDECTOMY      HYSTERECTOMY      SKIN CANCER EXCISION      x5    TONSILLECTOMY       Review of patient's allergies indicates:  No Known Allergies    Medications:    Current Outpatient Medications:     dorzolamide (TRUSOPT) 2 % ophthalmic solution, , Disp: , Rfl: 6    gabapentin (NEURONTIN) 100 MG capsule, Take 2 capsules (200 mg total) by mouth every evening., Disp: 180 capsule, Rfl: 1    LUMIGAN 0.01 % Drop, , Disp: , Rfl:     multivitamin (THERAGRAN) per tablet,  Take 1 tablet by mouth once daily., Disp: , Rfl:     timolol maleate 0.5% (TIMOPTIC) 0.5 % Drop, , Disp: , Rfl: 6    (Magic mouthwash) 1:1:1 Benadryl 12.5mg/5ml liq, aluminum & magnesium hydroxide-simehticone (Maalox), lidocaine viscous 2%, Swish and spit 5 mLs every 4 (four) hours as needed. for mouth sores (Patient not taking: Reported on 4/23/2020), Disp: 360 mL, Rfl: 0    acetaminophen (TYLENOL ORAL), Take 1 Dose by mouth 2 (two) times daily as needed., Disp: , Rfl:     mirtazapine (REMERON) 7.5 MG Tab, Take 1 tablet (7.5 mg total) by mouth every evening. (Patient not taking: Reported on 4/23/2020), Disp: 30 tablet, Rfl: 6    mupirocin (BACTROBAN) 2 % ointment, Apply topically 3 (three) times daily., Disp: , Rfl:     [START ON 4/27/2020] oxyCODONE-acetaminophen (PERCOCET) 5-325 mg per tablet, Take one half pill up to twice a day as needed for pain. (Patient not taking: Reported on 4/23/2020), Disp: 30 tablet, Rfl: 0    traMADol (ULTRAM) 50 mg tablet, Take 1 tablet (50 mg total) by mouth every 6 (six) hours as needed for Pain. (Patient not taking: Reported on 3/19/2020), Disp: 30 tablet, Rfl: 1    24h Oral Morphine Equivalents (OME):  N/A    Objective:     Physical Exam:  Vitals:    04/23/20 1304   BP: 102/68   Pulse: 76   Resp: 16   Temp: 98.9 °F (37.2 °C)   TempSrc: Temporal   SpO2: 99%   PainSc: 0-No pain     There is no height or weight on file to calculate BMI.    Physical Exam   Constitutional: She is oriented to person, place, and time. She appears well-developed and well-nourished yet thin and frail. No distress. Walking with a cane.  HENT:   Mouth/Throat: Oropharynx is clear and moist.   Eyes:   left eye has complete ptosis (unable to hold eyelid open at all) , wearing left eye patch  Cardiovascular: Normal rate and intact distal pulses.   Murmur: faint systolic murmur at base of heart.  predominantly regular rate with some irregular beats   Pulmonary/Chest: Effort normal and breath sounds normal.  No respiratory distress. She has no wheezes. She has no rales.   Musculoskeletal: She exhibits 2+ non-pitting edema to BLE, no ulcerations.  Neurological: She is alert and oriented to person, place, and time.   Skin: Skin is warm and dry. She is not diaphoretic.   left central cheek has ulcerated lesion   Psychiatric: She has a normal mood and affect. Her behavior is normal. Judgment and thought content normal.     Labs:  CBC:   WBC   Date Value Ref Range Status   12/31/2019 9.16 3.90 - 12.70 K/uL Final       Hemoglobin   Date Value Ref Range Status   12/31/2019 12.6 12.0 - 16.0 g/dL Final       Hematocrit   Date Value Ref Range Status   12/31/2019 41.5 37.0 - 48.5 % Final       Mean Corpuscular Volume   Date Value Ref Range Status   12/31/2019 102 (H) 82 - 98 fL Final       Platelets   Date Value Ref Range Status   12/31/2019 327 150 - 350 K/uL Final       LFT:   Lab Results   Component Value Date    AST 21 12/31/2019    ALKPHOS 83 12/31/2019    BILITOT 0.4 12/31/2019       Albumin:   Albumin   Date Value Ref Range Status   12/31/2019 3.9 3.5 - 5.2 g/dL Final     Protein:   Total Protein   Date Value Ref Range Status   12/31/2019 7.1 6.0 - 8.4 g/dL Final       Radiology:  I have reviewed all pertinent imaging results/findings within the past 24 hours.    Psychosocial/Cultural/Spiritual:   · F- Zee and Belief:  Adventism              · I - Importance: very important  · C - Community: [facility]  · A - Address in Care: no issues at this time    Assessment:     1. Encounter for palliative care    2. Brain tumor    3. Edema of both lower legs due to peripheral venous insufficiency    4. Squamous cell carcinoma of cheek        Plan:     Ethical / Legal: Advance Care Planning   · Surrogate decision maker:  Name Delicia, Relationship: daughter  · Code Status: DNR  · [place]:  Form left with patient and daughter for review, they would like to discuss it with other children  · Other advance directive:  none,  Capacity to make medical decisions:  intact, Conflict: none      Summer was seen today for follow-up.    Diagnoses and all orders for this visit:    Encounter for palliative care  -     Ambulatory referral/consult to [facility]; Future    Brain tumor  -     Ambulatory referral/consult to [facility]  -     Ambulatory referral/consult to [facility]; Future    Edema of both lower legs due to peripheral venous insufficiency  -     Ambulatory referral/consult to [facility]; Future    Squamous cell carcinoma of cheek  -     Ambulatory referral/consult to [facility]; Future      Time allowed for questions, all questions answered. [facility] contact information left with patient today for any future concerns.    Were controlled substances prescribed?  No    > 50% of 60 min visit spent in chart review, face to face discussion of goals of care,  symptom assessment, coordination of care and emotional support.    Follow Up Appointments:   Future Appointments   Date Time Provider Department Center   7/24/2020  9:45 AM Antonio Sears MD [facility]   Verbal consent obtained from patient daughter Diana for visit today.    Signature:  Gi Crow NP     Attestation:   Screening criteria to assess the level of the patient's risk for infection with COVID-19 as recommended by the CDC at the time of the above documented home visit concluded appropriateness to proceed. Universal precautions were maintained at all times, including provider wearing a mask and gloves during entire visit and use of 60% alcohol gel hand  immediately prior to entry and upon departure of patient's home as well as cleaning of equipment used in home visit with antibacterial/germicidal disposable wipes.

## 2020-04-25 PROBLEM — R60.0 EDEMA OF BOTH LOWER LEGS DUE TO PERIPHERAL VENOUS INSUFFICIENCY: Status: ACTIVE | Noted: 2020-04-25

## 2020-04-25 PROBLEM — I87.2 EDEMA OF BOTH LOWER LEGS DUE TO PERIPHERAL VENOUS INSUFFICIENCY: Status: ACTIVE | Noted: 2020-04-25

## 2020-05-06 ENCOUNTER — TELEPHONE (OUTPATIENT)
Dept: FAMILY MEDICINE | Facility: CLINIC | Age: 85
End: 2020-05-06

## 2020-05-06 ENCOUNTER — OFFICE VISIT (OUTPATIENT)
Dept: FAMILY MEDICINE | Facility: CLINIC | Age: 85
End: 2020-05-06
Payer: MEDICARE

## 2020-05-06 VITALS
OXYGEN SATURATION: 97 % | DIASTOLIC BLOOD PRESSURE: 70 MMHG | SYSTOLIC BLOOD PRESSURE: 138 MMHG | HEART RATE: 88 BPM | WEIGHT: 101.31 LBS | HEIGHT: 63 IN | BODY MASS INDEX: 17.95 KG/M2 | TEMPERATURE: 98 F

## 2020-05-06 DIAGNOSIS — H61.22 IMPACTED CERUMEN OF LEFT EAR: ICD-10-CM

## 2020-05-06 PROCEDURE — 99999 PR PBB SHADOW E&M-EST. PATIENT-LVL III: CPT | Mod: PBBFAC,,, | Performed by: FAMILY MEDICINE

## 2020-05-06 PROCEDURE — 1159F MED LIST DOCD IN RCRD: CPT | Mod: S$GLB,,, | Performed by: FAMILY MEDICINE

## 2020-05-06 PROCEDURE — 1101F PR PT FALLS ASSESS DOC 0-1 FALLS W/OUT INJ PAST YR: ICD-10-PCS | Mod: CPTII,S$GLB,, | Performed by: FAMILY MEDICINE

## 2020-05-06 PROCEDURE — 99999 PR PBB SHADOW E&M-EST. PATIENT-LVL III: ICD-10-PCS | Mod: PBBFAC,,, | Performed by: FAMILY MEDICINE

## 2020-05-06 PROCEDURE — 69210 PR REMOVAL IMPACTED CERUMEN REQUIRING INSTRUMENTATION, UNILATERAL: ICD-10-PCS | Mod: S$GLB,,, | Performed by: FAMILY MEDICINE

## 2020-05-06 PROCEDURE — 69210 REMOVE IMPACTED EAR WAX UNI: CPT | Mod: S$GLB,,, | Performed by: FAMILY MEDICINE

## 2020-05-06 PROCEDURE — 99213 PR OFFICE/OUTPT VISIT, EST, LEVL III, 20-29 MIN: ICD-10-PCS | Mod: 25,S$GLB,, | Performed by: FAMILY MEDICINE

## 2020-05-06 PROCEDURE — 1159F PR MEDICATION LIST DOCUMENTED IN MEDICAL RECORD: ICD-10-PCS | Mod: S$GLB,,, | Performed by: FAMILY MEDICINE

## 2020-05-06 PROCEDURE — 99213 OFFICE O/P EST LOW 20 MIN: CPT | Mod: 25,S$GLB,, | Performed by: FAMILY MEDICINE

## 2020-05-06 PROCEDURE — 1101F PT FALLS ASSESS-DOCD LE1/YR: CPT | Mod: CPTII,S$GLB,, | Performed by: FAMILY MEDICINE

## 2020-05-06 NOTE — TELEPHONE ENCOUNTER
----- Message from Stephanie Barriga MA sent at 5/6/2020  1:41 PM CDT -----  Contact: patient daughter   Type: Needs Medical Advice  Who Called:  Patient daughter Diana  Symptoms (please be specific):  Loud noise in left ear   How long has patient had these symptoms:  Started about 30 mins ago today   Pharmacy name and phone #:    Best Call Back Number: [phone]  Additional Information: patient daughter will like to know she bring patient to urgent care or schedule appt with Dr. Pereira today

## 2020-05-06 NOTE — TELEPHONE ENCOUNTER
All of a sudden, within the last 45min, pt called dtr wanting to be seen by a doctor because of a loud noise in L ear, no pain, no drainage, no use of qtip, etc. BP has been very good and pt is not on any BP meds. Pt does wear hearing aids, but has not had any probs with them. Appt made for 3pm today. Instructions given regarding the sign in process.

## 2020-05-06 NOTE — PROGRESS NOTES
THIS DOCUMENT WAS MADE IN PART WITH VOICE RECOGNITION SOFTWARE.  OCCASIONALLY THIS SOFTWARE WILL MISINTERPRET WORDS OR PHRASES.    Assessment and Plan:    1. Impacted cerumen of left ear  Procedure-cerumen disimpaction performed using curette and washout  Patient reported relief of symptoms  No complications  Follow-up as needed  Recommended Debrox        ______________________________________________________________________  Subjective:    Chief Complaint:  Chief Complaint   Patient presents with    ear noise     left ear, since this am, normally wears hearing aids, last Ca tx 03/23/2020        HPI:  Summer is a 87 y.o. 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  Denies any pain, fever, respiratory symptoms  Denies any vertigo or any other neurologic symptoms    Past Medical History:  Past Medical History:   Diagnosis Date    Cancer     skin cancer       Past Surgical History:  Past Surgical History:   Procedure Laterality Date    APPENDECTOMY      HYSTERECTOMY      SKIN CANCER EXCISION      x5    TONSILLECTOMY         Family History:  Family History   Problem Relation Age of Onset    Alcohol abuse Mother     Stroke Father         Hemorrhagic       Social History:  Social History     Socioeconomic History    Marital status:      Spouse name: Not on file    Number of children: Not on file    Years of education: Not on file    Highest education level: Not on file   Occupational History    Not on file   Social Needs    Financial resource strain: Not on file    Food insecurity:     Worry: Not on file     Inability: Not on file    Transportation needs:     Medical: Not on file     Non-medical: Not on file   Tobacco Use    Smoking status: Never Smoker    Smokeless tobacco: Never Used   Substance  and Sexual Activity    Alcohol use: Not Currently    Drug use: Not Currently    Sexual activity: Not Currently   Lifestyle    Physical activity:     Days per week: Not on file     Minutes per session: Not on file    Stress: Not on file   Relationships    Social connections:     Talks on phone: Not on file     Gets together: Not on file     Attends Islam service: Not on file     Active member of club or organization: Not on file     Attends meetings of clubs or organizations: Not on file     Relationship status: Not on file   Other Topics Concern    Not on file   Social History Narrative    Not on file       Medications:  Current Outpatient Medications on File Prior to Visit   Medication Sig Dispense Refill    (Magic mouthwash) 1:1:1 Benadryl 12.5mg/5ml liq, aluminum & magnesium hydroxide-simehticone (Maalox), lidocaine viscous 2% Swish and spit 5 mLs every 4 (four) hours as needed. for mouth sores (Patient not taking: Reported on 4/23/2020) 360 mL 0    acetaminophen (TYLENOL ORAL) Take 1 Dose by mouth 2 (two) times daily as needed.      dorzolamide (TRUSOPT) 2 % ophthalmic solution   6    gabapentin (NEURONTIN) 100 MG capsule Take 2 capsules (200 mg total) by mouth every evening. 180 capsule 1    LUMIGAN 0.01 % Drop       mirtazapine (REMERON) 7.5 MG Tab Take 1 tablet (7.5 mg total) by mouth every evening. (Patient not taking: Reported on 4/23/2020) 30 tablet 6    multivitamin (THERAGRAN) per tablet Take 1 tablet by mouth once daily.      mupirocin (BACTROBAN) 2 % ointment Apply topically 3 (three) times daily.      oxyCODONE-acetaminophen (PERCOCET) 5-325 mg per tablet Take one half pill up to twice a day as needed for pain. (Patient not taking: Reported on 4/23/2020) 30 tablet 0    timolol maleate 0.5% (TIMOPTIC) 0.5 % Drop   6    traMADol (ULTRAM) 50 mg tablet Take 1 tablet (50 mg total) by mouth every 6 (six) hours as needed for Pain. (Patient not taking: Reported on 3/19/2020) 30 tablet 1  "    No current facility-administered medications on file prior to visit.        Allergies:  Patient has no known allergies.    Immunizations:  Immunization History   Administered Date(s) Administered    Influenza - High Dose - PF (65 years and older) 10/14/2019    Pneumococcal Conjugate - 13 Valent 01/27/2020       Review of Systems:  Review of Systems   HENT: Positive for tinnitus.    All other systems reviewed and are negative.      Objective:    Vitals:  Vitals:    05/06/20 1504   Weight: 45.9 kg (101 lb 4.8 oz)   Height: 5' 3" (1.6 m)       Physical Exam   Constitutional: She appears well-developed. No distress.   HENT:   Head: Normocephalic and atraumatic.   Ears:    Eyes: EOM are normal.   Neck: Normal range of motion.   Pulmonary/Chest: Effort normal. No respiratory distress.   Psychiatric: She has a normal mood and affect. Her behavior is normal. Judgment and thought content normal.   Vitals reviewed.      Data:  No previous labs, imaging, or notes available.        Negro Colbert MD  Family Medicine    "

## 2020-05-11 ENCOUNTER — PATIENT MESSAGE (OUTPATIENT)
Dept: FAMILY MEDICINE | Facility: CLINIC | Age: 85
End: 2020-05-11

## 2020-05-11 NOTE — TELEPHONE ENCOUNTER
Before I respond, can someone check and see if Dr. Ev LEMUS and Dr. Thong Calle/Onc are covered with [employer]? [facility].

## 2020-05-17 ENCOUNTER — PATIENT MESSAGE (OUTPATIENT)
Dept: FAMILY MEDICINE | Facility: CLINIC | Age: 85
End: 2020-05-17

## 2020-05-17 DIAGNOSIS — Z12.31 ENCOUNTER FOR SCREENING MAMMOGRAM FOR BREAST CANCER: Primary | ICD-10-CM

## 2020-05-18 ENCOUNTER — PATIENT MESSAGE (OUTPATIENT)
Dept: FAMILY MEDICINE | Facility: CLINIC | Age: 85
End: 2020-05-18

## 2020-05-25 ENCOUNTER — TELEPHONE (OUTPATIENT)
Dept: HOME HEALTH SERVICES | Facility: CLINIC | Age: 85
End: 2020-05-25

## 2020-05-25 DIAGNOSIS — C44.329 SQUAMOUS CELL CARCINOMA OF CHEEK: Primary | ICD-10-CM

## 2020-05-25 NOTE — TELEPHONE ENCOUNTER
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    Amber would like to be contacted first, before her mother, because patient is not aware of possibly needing placement in a care facility.

## 2020-05-27 ENCOUNTER — SSC ENCOUNTER (OUTPATIENT)
Dept: ADMINISTRATIVE | Facility: OTHER | Age: 85
End: 2020-05-27

## 2020-05-27 NOTE — PROGRESS NOTES
Please note the following patient's information was forwarded to [facility] (N) for case management and/or  on 5/27/2020.    Please contact Ext. [phone] with any questions.    Thank you,    Ines Sánchez, [facility] [place] [facility]  Outpatient Case Mgmnt  [phone]

## 2020-06-08 LAB
FINAL PATHOLOGIC DIAGNOSIS: NORMAL
GROSS: NORMAL
SUPPLEMENTAL DIAGNOSIS: NORMAL

## 2020-06-18 ENCOUNTER — CARE AT HOME (OUTPATIENT)
Dept: HOME HEALTH SERVICES | Facility: CLINIC | Age: 85
End: 2020-06-18
Payer: MEDICARE

## 2020-06-18 VITALS
HEART RATE: 70 BPM | WEIGHT: 98.81 LBS | SYSTOLIC BLOOD PRESSURE: 112 MMHG | BODY MASS INDEX: 19.3 KG/M2 | OXYGEN SATURATION: 99 % | TEMPERATURE: 98 F | RESPIRATION RATE: 16 BRPM | DIASTOLIC BLOOD PRESSURE: 68 MMHG

## 2020-06-18 DIAGNOSIS — C44.329 SQUAMOUS CELL CARCINOMA OF CHEEK: ICD-10-CM

## 2020-06-18 DIAGNOSIS — I87.2 EDEMA OF BOTH LOWER LEGS DUE TO PERIPHERAL VENOUS INSUFFICIENCY: ICD-10-CM

## 2020-06-18 DIAGNOSIS — D49.6 BRAIN TUMOR: Primary | ICD-10-CM

## 2020-06-18 DIAGNOSIS — Z51.5 ENCOUNTER FOR PALLIATIVE CARE: ICD-10-CM

## 2020-06-18 DIAGNOSIS — R60.0 EDEMA OF BOTH LOWER LEGS DUE TO PERIPHERAL VENOUS INSUFFICIENCY: ICD-10-CM

## 2020-06-18 PROCEDURE — 1159F PR MEDICATION LIST DOCUMENTED IN MEDICAL RECORD: ICD-10-PCS | Mod: S$GLB,,, | Performed by: NURSE PRACTITIONER

## 2020-06-18 PROCEDURE — 1100F PR PT FALLS ASSESS DOC 2+ FALLS/FALL W/INJURY/YR: ICD-10-PCS | Mod: CPTII,S$GLB,, | Performed by: NURSE PRACTITIONER

## 2020-06-18 PROCEDURE — 99350 PR HOME VISIT,ESTAB PATIENT,LEVEL IV: ICD-10-PCS | Mod: S$GLB,,, | Performed by: NURSE PRACTITIONER

## 2020-06-18 PROCEDURE — 3288F FALL RISK ASSESSMENT DOCD: CPT | Mod: CPTII,S$GLB,, | Performed by: NURSE PRACTITIONER

## 2020-06-18 PROCEDURE — 1126F PR PAIN SEVERITY QUANTIFIED, NO PAIN PRESENT: ICD-10-PCS | Mod: S$GLB,,, | Performed by: NURSE PRACTITIONER

## 2020-06-18 PROCEDURE — 99350 HOME/RES VST EST HIGH MDM 60: CPT | Mod: S$GLB,,, | Performed by: NURSE PRACTITIONER

## 2020-06-18 PROCEDURE — 1126F AMNT PAIN NOTED NONE PRSNT: CPT | Mod: S$GLB,,, | Performed by: NURSE PRACTITIONER

## 2020-06-18 PROCEDURE — 1159F MED LIST DOCD IN RCRD: CPT | Mod: S$GLB,,, | Performed by: NURSE PRACTITIONER

## 2020-06-18 PROCEDURE — 3288F PR FALLS RISK ASSESSMENT DOCUMENTED: ICD-10-PCS | Mod: CPTII,S$GLB,, | Performed by: NURSE PRACTITIONER

## 2020-06-18 PROCEDURE — 1100F PTFALLS ASSESS-DOCD GE2>/YR: CPT | Mod: CPTII,S$GLB,, | Performed by: NURSE PRACTITIONER

## 2020-06-18 NOTE — PROGRESS NOTES
"[facility]  Palliative Care Home Visit    Visit Date: 6/18/2020  Encounter Provider: Gi Crow NP  PCP:  Kayla Pereira MD    Subjective:      Patient ID: Summer Ch is a 87 y.o. female.    Consult Requested By:  Dr. 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      She is being seen today at her home with her daughter Diana present for a Palliative Care follow up visit.  Summer is AAOx3, very pleasant and participates in this visit. Summer reports that she feels "great", has no pain at all and has not taken any pain medication since April. She did has a few issues with left ear pain over the last few weeks and was seen in the ER and at PCP office for cerumen build up. There was a concern of reoccurrence of cancer with spread to ear. However, [facility] per Diana indicated no reoccurrence or spread of cancer. Patient was seen by Dr. Quinonez last week and is currently receiving no treatment. Patient will have a repeat MRI in 3 months and follow up with Dr. Quinonez 9/2020. At that point it will be decided if patient needs chemotherapy or immunotherapy.     Summer reports that she feels as though she is getting stronger and has even done a little gardening outside. Her appetite is reported to be good and her weight is stable.   Both legs continue to be swollen today and Diana expresses that the patient is doing too much and not elevating her feet. Summer has a history of venous stasis ulcerations. Encouraged patient to stay off of her feet as much as possible, wear compression hoses which she has and monitor closely for breakdown.    VSS. Denies fever, chest pain, shortness of breath, nausea, vomiting, diarrhea. Denies any acute issues, concerns or " complaints to address on today's visit. Reports taking all medications as prescribed. No other needs identified at this time. Risks of environmental exposure to coronavirus discussed including: social distancing, hand hygiene, and limiting departures from the home for necessities only.  Reports understanding and willingness to comply.         Goals of care  Patient does not have any Advanced Directives completed at this time. We discussed at length what options she has and she did state that she would want to be a DNR. Remaining in her home independently for as long as possible is what is desired. Delicia has not been able to discuss or review Advanced Directives still with other siblings. Comfort care was verbalized as desired but not filled out on [employer] form yet.     Instructions:  1. Palliative Care follow-up in 4-6 weeks.  2. Continue all medications.  3. Fall precautions at all times.  4. If symptoms worsen, call 911 or go to ED. Call Palliative care NP.   5. Please review and complete Advanced Directives forms left at home and return to NP or PCP office.  6. Wear compression stockings daily, elevate feet while seated, stay off of feet as much as possible, limit salt intake.        Review of Systems   Constitutional: Positive for appetite change, fatigue and unexpected weight change (all improved from last visit). Negative for chills.   HENT: Negative.    Eyes: Negative.    Respiratory: Negative.    Cardiovascular: Negative.    Gastrointestinal: Positive for constipation. Negative for abdominal distention, abdominal pain, diarrhea, nausea and vomiting.   Endocrine: Negative.    Genitourinary: Negative.    Musculoskeletal: Positive for gait problem (unsteady at times ).   Skin:        Left face with ulceration from squamous cell ca and current radiation therapy.   Neurological: Negative for weakness and headaches. Negative for dizziness and seizures.   Hematological: Bruises/bleeds easily.    Psychiatric/Behavioral: Negative.          Assessments:  · Environmental: clean, adequate lighting and temperature, no foul odors, lives alone children are with her daily.  · Functional Status: independent, uses cane, needs help with housework  · Safety: lives alone, home alone at night  · Nutritional: adequate access, appetite improving  · Home Health/DME/Supplies: No current Home Health, uses cane for ambulation          Symptom Assessment (ESAS 0-10 scale)     ESAS 0 1 2 3 4 5 6 7 8 9 10   Pain x             Dyspnea x             Anxiety x             Nausea x             Depression  x             Anorexia x             Fatigue  x            Insomnia x             Restlessness  x             Agitation x               Constipation    yes  Bowel Management Plan (BMP): yes  Diarrhea        no  Comments: uses MOM, will start Miralax     Performance Status: PPS Score 60  Karnofsky Score:  60  EGOC:  3    History:  Past Medical History:   Diagnosis Date    Cancer     skin cancer     Family History   Problem Relation Age of Onset    Alcohol abuse Mother     Stroke Father         Hemorrhagic     Past Surgical History:   Procedure Laterality Date    APPENDECTOMY      HYSTERECTOMY      SKIN CANCER EXCISION      x5    TONSILLECTOMY       Review of patient's allergies indicates:  No Known Allergies    Medications:    Current Outpatient Medications:     acetaminophen (TYLENOL ORAL), Take 1 Dose by mouth 2 (two) times daily as needed., Disp: , Rfl:     dorzolamide (TRUSOPT) 2 % ophthalmic solution, , Disp: , Rfl: 6    gabapentin (NEURONTIN) 100 MG capsule, Take 2 capsules (200 mg total) by mouth every evening., Disp: 180 capsule, Rfl: 1    LUMIGAN 0.01 % Drop, , Disp: , Rfl:     multivitamin (THERAGRAN) per tablet, Take 1 tablet by mouth once daily., Disp: , Rfl:     timolol maleate 0.5% (TIMOPTIC) 0.5 % Drop, , Disp: , Rfl: 6    (Magic mouthwash) 1:1:1 Benadryl 12.5mg/5ml liq, aluminum & magnesium  hydroxide-simehticone (Maalox), lidocaine viscous 2%, Swish and spit 5 mLs every 4 (four) hours as needed. for mouth sores (Patient not taking: Reported on 4/23/2020), Disp: 360 mL, Rfl: 0    mirtazapine (REMERON) 7.5 MG Tab, Take 1 tablet (7.5 mg total) by mouth every evening. (Patient not taking: Reported on 4/23/2020), Disp: 30 tablet, Rfl: 6    mupirocin (BACTROBAN) 2 % ointment, Apply topically 3 (three) times daily., Disp: , Rfl:     oxyCODONE-acetaminophen (PERCOCET) 5-325 mg per tablet, Take one half pill up to twice a day as needed for pain. (Patient not taking: Reported on 4/23/2020), Disp: 30 tablet, Rfl: 0    traMADol (ULTRAM) 50 mg tablet, Take 1 tablet (50 mg total) by mouth every 6 (six) hours as needed for Pain. (Patient not taking: Reported on 3/19/2020), Disp: 30 tablet, Rfl: 1    24h Oral Morphine Equivalents (OME):  N/A    Objective:     Physical Exam:  Vitals:    06/18/20 1418   BP: 112/68   Pulse: 70   Resp: 16   Temp: 98.4 °F (36.9 °C)   TempSrc: Temporal   SpO2: 99%   Weight: 44.8 kg (98 lb 12.8 oz)   PainSc: 0-No pain     Body mass index is 19.3 kg/m².    Physical Exam   Constitutional: She is oriented to person, place, and time. She appears well-developed and well-nourished yet thin and frail. No distress. Walking with a cane.  HENT:   Mouth/Throat: Oropharynx is clear and moist.   Eyes:   left eye has complete ptosis (unable to hold eyelid open at all) , wearing left eye patch  Cardiovascular: Normal rate and intact distal pulses.   Murmur: faint systolic murmur at base of heart.  predominantly regular rate with some irregular beats   Pulmonary/Chest: Effort normal and breath sounds normal. No respiratory distress. She has no wheezes. She has no rales.   Musculoskeletal: She exhibits 2+ non-pitting edema to BLE, no ulcerations.  Neurological: She is alert and oriented to person, place, and time.   Skin: Skin is warm and dry. She is not diaphoretic.   left central cheek has healing  ulcerated lesion   Psychiatric: She has a normal mood and affect. Her behavior is normal. Judgment and thought content normal.        Labs:  CBC:   WBC   Date Value Ref Range Status   12/31/2019 9.16 3.90 - 12.70 K/uL Final      82 -  Hemoglobin   Date Value Ref Range Status   12/31/2019 12.6 12.0 - 16.0 g/dL Final       Hematocrit   Date Value Ref Range Status   12/31/2019 41.5 37.0 - 48.5 % Final   20  Mean Corpuscular Volume   Date Value Ref Range Status   12/31/2019 102 (H) 82 - 98 fL Final   19    BILITOT 0.4 12/31/2019       Albumin:   Albumin   Date Value Ref Range Status   12/31/2019 3.9 3.5 - 5.2 g/dL Final     Protein:   Total Protein   Date Value Ref Range Status   12/31/2019 7.1 6.0 - 8.4 g/dL Final       Radiology:  I have reviewed all pertinent imaging results/findings within the past 24 hours.    Psychosocial/Cultural/Spiritual:   · F- Zee and Belief:  Yarsanism              · I - Importance: very important  · C - Community: [facility]  · A - Address in Care: no issues at this time    Assessment:     1. Brain tumor    2. Encounter for palliative care    3. Edema of both lower legs due to peripheral venous insufficiency    4. Squamous cell carcinoma of cheek        Plan:     Ethical / Legal: Advance Care Planning   · Surrogate decision maker:  Roney Nesbitt, Relationship: daughter  · Code Status: DNR  · LaPOST:  Form left with patient and daughter for review, they would like to discuss it with other children  · Other advance directive:  none, Capacity to make medical decisions:  intact, Conflict: none  ·   Summer was seen today for follow-up.    Diagnoses and all orders for this visit:    Brain tumor  -     Ambulatory referral/consult to [facility]; Future    Encounter for palliative care    Edema of both lower legs due to peripheral venous insufficiency    Squamous cell carcinoma of cheek      Time allowed for questions, all questions answered.     Were  controlled substances prescribed?  No    60 min visit spent in chart review, face to face discussion of goals of care,  symptom assessment, palliative care discussion, coordination of care and emotional support.    Follow Up Appointments:   Future Appointments   Date Time Provider Department Center   7/24/2020  9:45 AM Antonio Sears MD [facility] [place]   7/30/2020  2:00 PM Gi Crow NP [facility] [place]   Verbal consent obtained from patient on visit today.    Signature:  Gi Crow NP     Attestation:   Screening criteria to assess the level of the patient's risk for infection with COVID-19 as recommended by the CDC at the time of the above documented home visit concluded appropriateness to proceed. Universal precautions were maintained at all times, including provider wearing a mask and gloves during entire visit and use of 60% alcohol gel hand  immediately prior to entry and upon departure of patient's home as well as cleaning of equipment used in home visit with antibacterial/germicidal disposable wipes.

## 2020-07-28 ENCOUNTER — PATIENT MESSAGE (OUTPATIENT)
Dept: FAMILY MEDICINE | Facility: CLINIC | Age: 85
End: 2020-07-28

## 2020-07-28 DIAGNOSIS — M79.2 NEUROPATHIC PAIN: ICD-10-CM

## 2020-07-28 RX ORDER — GABAPENTIN 100 MG/1
200 CAPSULE ORAL NIGHTLY
Qty: 180 CAPSULE | Refills: 1 | Status: SHIPPED | OUTPATIENT
Start: 2020-07-28 | End: 2021-01-13 | Stop reason: SDUPTHER

## 2020-08-05 ENCOUNTER — CARE AT HOME (OUTPATIENT)
Dept: HOME HEALTH SERVICES | Facility: CLINIC | Age: 85
End: 2020-08-05
Payer: MEDICARE

## 2020-08-05 VITALS
HEART RATE: 80 BPM | WEIGHT: 103.13 LBS | SYSTOLIC BLOOD PRESSURE: 120 MMHG | OXYGEN SATURATION: 98 % | BODY MASS INDEX: 20.14 KG/M2 | DIASTOLIC BLOOD PRESSURE: 68 MMHG | RESPIRATION RATE: 16 BRPM | TEMPERATURE: 98 F

## 2020-08-05 DIAGNOSIS — I87.2 EDEMA OF BOTH LOWER LEGS DUE TO PERIPHERAL VENOUS INSUFFICIENCY: ICD-10-CM

## 2020-08-05 DIAGNOSIS — Z51.5 ENCOUNTER FOR PALLIATIVE CARE: ICD-10-CM

## 2020-08-05 DIAGNOSIS — C44.329 SQUAMOUS CELL CARCINOMA OF CHEEK: ICD-10-CM

## 2020-08-05 DIAGNOSIS — D49.6 BRAIN TUMOR: Primary | ICD-10-CM

## 2020-08-05 DIAGNOSIS — R60.0 EDEMA OF BOTH LOWER LEGS DUE TO PERIPHERAL VENOUS INSUFFICIENCY: ICD-10-CM

## 2020-08-05 PROCEDURE — 1159F MED LIST DOCD IN RCRD: CPT | Mod: S$GLB,,, | Performed by: NURSE PRACTITIONER

## 2020-08-05 PROCEDURE — 99350 PR HOME VISIT,ESTAB PATIENT,LEVEL IV: ICD-10-PCS | Mod: S$GLB,,, | Performed by: NURSE PRACTITIONER

## 2020-08-05 PROCEDURE — 1159F PR MEDICATION LIST DOCUMENTED IN MEDICAL RECORD: ICD-10-PCS | Mod: S$GLB,,, | Performed by: NURSE PRACTITIONER

## 2020-08-05 PROCEDURE — 99350 HOME/RES VST EST HIGH MDM 60: CPT | Mod: S$GLB,,, | Performed by: NURSE PRACTITIONER

## 2020-08-05 PROCEDURE — 1101F PT FALLS ASSESS-DOCD LE1/YR: CPT | Mod: CPTII,S$GLB,, | Performed by: NURSE PRACTITIONER

## 2020-08-05 PROCEDURE — 1126F PR PAIN SEVERITY QUANTIFIED, NO PAIN PRESENT: ICD-10-PCS | Mod: S$GLB,,, | Performed by: NURSE PRACTITIONER

## 2020-08-05 PROCEDURE — 1126F AMNT PAIN NOTED NONE PRSNT: CPT | Mod: S$GLB,,, | Performed by: NURSE PRACTITIONER

## 2020-08-05 PROCEDURE — 1101F PR PT FALLS ASSESS DOC 0-1 FALLS W/OUT INJ PAST YR: ICD-10-PCS | Mod: CPTII,S$GLB,, | Performed by: NURSE PRACTITIONER

## 2020-08-05 NOTE — PROGRESS NOTES
"[facility]  Palliative Care Home Visit    Visit Date: 8/5/2020  Encounter Provider: Gi Crow NP  PCP:  Kayla Pereira MD    Subjective:      Patient ID: Summer Ch is a 87 y.o. female.    Consult Requested By:  No ref. provider found  Reason for Consult:  Palliative Care  Visit time: 2:00 pm - 3:00 pm    Chief Complaint: Follow-up, squamous cell carcinoma of cheek with brain tumor/metastasis      Summer Ch is an 87 year old female with PMHX of venous stasis, history of skin cancer, current squamous cell carcinoma of cheek with brain tumor/metastasis.      Summer is being seen today for a palliative care follow up visit. Daughter Diana is present again today. Summer is AAO x 4 with no complaints. She reports she is feeling "great" and has even been doing some gardening work. Her appetite is reported to be great and she denies any recent weight loss. Diana reports that her mother has a follow up appointment with Dr. Quinonez later this month after she has an MRI to follow up on the status of her cancer. Summer states she is leaning towards doing immunotherapy if it is needed. Summer denies any recent headaches or ear pain that was bothering her 2 months ago.     Both legs continue to be swollen today and Diana expresses that the patient is doing too much and not elevating her feet. Summer has a history of venous stasis ulcerations. Encouraged patient to stay off of her feet as much as possible, wear compression hoses which she has and monitor closely for breakdown.     VSS. Denies fever, chest pain, shortness of breath, nausea, vomiting, diarrhea. Denies any acute issues, concerns or complaints to address on today's visit. Reports taking all medications as prescribed. No other needs identified at this time. Risks of environmental exposure to coronavirus discussed including: social distancing, hand hygiene, and limiting departures from the home for necessities only.  Reports understanding and " willingness to comply.          Goals of care  Patient does not have any Advanced Directives completed at this time. We discussed at length what options she has and she did state that she would want to be a DNR. Remaining in her home independently for as long as possible is what is desired. Delicia has not been able to discuss or review Advanced Directives still with other siblings. Comfort care was verbalized as desired but not filled out on [employer] form yet.     Instructions:  1. Palliative Care follow-up in 6-8 weeks or sooner if needed.  2. Continue all medications.  3. Fall precautions at all times.  4. If symptoms worsen, call 911 or go to ED. Call Palliative care NP.   5. Please review and complete Advanced Directives forms left at home and return to NP or PCP office.  6. Wear compression stockings daily, elevate feet while seated, stay off of feet as much as possible, limit salt intake.        Review of Systems   Constitutional: Negative for weight loss, appetite change, activity change. Negative for chills/fever.   HENT: Negative.    Eyes: Negative.    Respiratory: Negative.    Cardiovascular: Negative.    Gastrointestinal: Positive for constipation at times. Negative for abdominal distention, abdominal pain, diarrhea, nausea and vomiting.   Endocrine: Negative.    Genitourinary: Negative.    Musculoskeletal: Positive for gait problem (unsteady at times ).   Skin: intact, scattered bruises  Neurological: Negative for weakness and headaches. Negative for dizziness and seizures.   Hematological: Bruises/bleeds easily.   Psychiatric/Behavioral: Negative.          Assessments:  · Environmental: clean, adequate lighting and temperature, no foul odors, lives alone children are with her daily.  · Functional Status: independent, uses cane, needs help with housework  · Safety: lives alone, home alone at night  · Nutritional: adequate access, appetite improving  · Home Health/DME/Supplies: No current Home Health,  uses cane for ambulation          Symptom Assessment (ESAS 0-10 scale)      ESAS 0 1 2 3 4 5 6 7 8 9 10   Pain x                       Dyspnea x                       Anxiety x                       Nausea x                       Depression  x                       Anorexia x                       Fatigue   x                     Insomnia x                       Restlessness  x                       Agitation x                          Constipation    yes  Bowel Management Plan (BMP): yes  Diarrhea        no  Comments: uses MOM, will start Miralax     Performance Status: PPS Score 60  Karnofsky Score:  60  EGOC:  3       History:  Past Medical History:   Diagnosis Date    Cancer     skin cancer     Family History   Problem Relation Age of Onset    Alcohol abuse Mother     Stroke Father         Hemorrhagic     Past Surgical History:   Procedure Laterality Date    APPENDECTOMY      HYSTERECTOMY      SKIN CANCER EXCISION      x5    TONSILLECTOMY       Review of patient's allergies indicates:  No Known Allergies    Medications:    Current Outpatient Medications:     acetaminophen (TYLENOL ORAL), Take 1 Dose by mouth 2 (two) times daily as needed., Disp: , Rfl:     dorzolamide (TRUSOPT) 2 % ophthalmic solution, , Disp: , Rfl: 6    gabapentin (NEURONTIN) 100 MG capsule, Take 2 capsules (200 mg total) by mouth every evening., Disp: 180 capsule, Rfl: 1    LUMIGAN 0.01 % Drop, , Disp: , Rfl:     multivitamin (THERAGRAN) per tablet, Take 1 tablet by mouth once daily., Disp: , Rfl:     timolol maleate 0.5% (TIMOPTIC) 0.5 % Drop, , Disp: , Rfl: 6    (Magic mouthwash) 1:1:1 Benadryl 12.5mg/5ml liq, aluminum & magnesium hydroxide-simehticone (Maalox), lidocaine viscous 2%, Swish and spit 5 mLs every 4 (four) hours as needed. for mouth sores (Patient not taking: Reported on 4/23/2020), Disp: 360 mL, Rfl: 0    mirtazapine (REMERON) 7.5 MG Tab, Take 1 tablet (7.5 mg total) by mouth every evening. (Patient not taking:  Reported on 4/23/2020), Disp: 30 tablet, Rfl: 6    mupirocin (BACTROBAN) 2 % ointment, Apply topically 3 (three) times daily., Disp: , Rfl:     oxyCODONE-acetaminophen (PERCOCET) 5-325 mg per tablet, Take one half pill up to twice a day as needed for pain. (Patient not taking: Reported on 4/23/2020), Disp: 30 tablet, Rfl: 0    traMADol (ULTRAM) 50 mg tablet, Take 1 tablet (50 mg total) by mouth every 6 (six) hours as needed for Pain. (Patient not taking: Reported on 3/19/2020), Disp: 30 tablet, Rfl: 1    24h Oral Morphine Equivalents (OME):  N/A    Objective:     Physical Exam:  Vitals:    08/05/20 1400   BP: 120/68   Pulse: 80   Resp: 16   Temp: 98 °F (36.7 °C)   TempSrc: Temporal   SpO2: 98%   Weight: 46.8 kg (103 lb 1.6 oz)   PainSc: 0-No pain     Body mass index is 20.14 kg/m².    Physical Exam   Constitutional: She is oriented to person, place, and time. She appears well-developed and well-nourished yet thin and frail. No distress. Walking with a cane.  HENT:   Mouth/Throat: Oropharynx is clear and moist.   Eyes:   left eye has complete ptosis (unable to hold eyelid open at all) , wearing left eye patch  Cardiovascular: Normal rate and intact distal pulses.   Murmur: faint systolic murmur at base of heart.  predominantly regular rate with some irregular beats   Pulmonary/Chest: Effort normal and breath sounds normal. No respiratory distress. She has no wheezes. She has no rales.   Musculoskeletal: She exhibits 2+ non-pitting edema to BLE, no ulcerations.  Neurological: She is alert and oriented to person, place, and time.   Skin: Skin is warm and dry. She is not diaphoretic.   left central cheek has healing ulcerated lesion   Psychiatric: She has a normal mood and affect. Her behavior is normal. Judgment and thought content normal.       Advance Care Planning   Ethical / Legal: Advance Care Planning   · Surrogate decision maker:  Roney Nesbitt, Relationship: daughter  · Code Status: DNR  · LaPOST:  Form  left with patient and daughter for review, they would like to discuss it with other children  · Other advance directive:  none, Capacity to make medical decisions:  intact, Conflict: none  ·         Labs:  CBC:   WBC   Date Value Ref Range Status   12/31/2019 9.16 3.90 - 12.70 K/uL Final       Hemoglobin   Date Value Ref Range Status   12/31/2019 12.6 12.0 - 16.0 g/dL Final       Hematocrit   Date Value Ref Range Status   12/31/2019 41.5 37.0 - 48.5 % Final       Mean Corpuscular Volume   Date Value Ref Range Status   12/31/2019 102 (H) 82 - 98 fL Final       Platelets   Date Value Ref Range Status   12/31/2019 327 150 - 350 K/uL Final       LFT:   Lab Results   Component Value Date    AST 21 12/31/2019    ALKPHOS 83 12/31/2019    BILITOT 0.4 12/31/2019       Albumin:   Albumin   Date Value Ref Range Status   12/31/2019 3.9 3.5 - 5.2 g/dL Final     Protein:   Total Protein   Date Value Ref Range Status   12/31/2019 7.1 6.0 - 8.4 g/dL Final       Radiology:  I have reviewed all pertinent imaging results/findings within the past 24 hours.    Psychosocial/Cultural/Spiritual:   · F- Zee and Belief:  Anglican              · I - Importance: very important  · C - Community: [facility]  · A - Address in Care: no issues at this time    Assessment:     1. Brain tumor    2. Encounter for palliative care    3. Edema of both lower legs due to peripheral venous insufficiency    4. Squamous cell carcinoma of cheek        Plan:   Summer was seen today for follow-up.    Diagnoses and all orders for this visit:    Brain tumor    Encounter for palliative care    Edema of both lower legs due to peripheral venous insufficiency    Squamous cell carcinoma of cheek        Were controlled substances prescribed?  No    > 50% of 60 min visit spent in chart review, face to face discussion of goals of care,  symptom assessment, coordination of care and emotional support. Topics discussed: edema, safety, fall prevention, squamous  cell carcinoma of cheek with metastasis.    Follow Up Appointments:   No future appointments.    Verbal consent for visit obtained from patient today.     Signature:  Gi Crow NP     Attestation:   Screening criteria to assess the level of the patient's risk for infection with COVID-19 as recommended by the CDC at the time of the above documented home visit concluded appropriateness to proceed. Universal precautions were maintained at all times, including provider wearing a mask and gloves during entire visit and use of 60% alcohol gel hand  immediately prior to entry and upon departure of patient's home as well as cleaning of equipment used in home visit with antibacterial/germicidal disposable wipes.

## 2020-09-28 ENCOUNTER — PATIENT MESSAGE (OUTPATIENT)
Dept: FAMILY MEDICINE | Facility: CLINIC | Age: 85
End: 2020-09-28

## 2020-09-29 ENCOUNTER — TELEPHONE (OUTPATIENT)
Dept: OTOLARYNGOLOGY | Facility: CLINIC | Age: 85
End: 2020-09-29

## 2020-09-29 NOTE — TELEPHONE ENCOUNTER
----- Message from Pati Engle sent at 9/29/2020  3:46 PM CDT -----  Contact: portal  Appointment Request From: Summer Ch    With Provider: Dr Venu Carreno    Preferred Date Range: 10/6/2020 - 10/27/2020    Preferred Times: Any Time    Reason for visit: Ear noise/pain wax buildup    Comments:  Left ear was previously examined by [facility]   Debris was removed but they were unable to remove all. Noise and pain.

## 2020-10-05 ENCOUNTER — PATIENT OUTREACH (OUTPATIENT)
Dept: ADMINISTRATIVE | Facility: OTHER | Age: 85
End: 2020-10-05

## 2020-10-05 NOTE — PROGRESS NOTES
Health Maintenance Due   Topic Date Due    TETANUS VACCINE  10/07/1950    Shingles Vaccine (1 of 2) 10/07/1982    Pneumococcal Vaccine (65+ High/Highest Risk) (2 of 2 - PPSV23) 03/23/2020    Influenza Vaccine (1) 08/01/2020     Updates were requested from care everywhere.  Chart was reviewed for overdue Proactive [facility] Encounters ([facility]) topics (CRS, Breast Cancer Screening, Eye exam)  Health Maintenance has been updated.  LINKS immunization registry triggered.  Immunizations were reconciled.

## 2020-10-06 ENCOUNTER — OFFICE VISIT (OUTPATIENT)
Dept: OTOLARYNGOLOGY | Facility: CLINIC | Age: 85
End: 2020-10-06
Payer: MEDICARE

## 2020-10-06 ENCOUNTER — PATIENT MESSAGE (OUTPATIENT)
Dept: DERMATOLOGY | Facility: CLINIC | Age: 85
End: 2020-10-06

## 2020-10-06 VITALS — WEIGHT: 105 LBS | BODY MASS INDEX: 20.51 KG/M2

## 2020-10-06 DIAGNOSIS — H92.02 REFERRED OTALGIA OF LEFT EAR: ICD-10-CM

## 2020-10-06 DIAGNOSIS — H61.23 BILATERAL IMPACTED CERUMEN: Primary | ICD-10-CM

## 2020-10-06 PROCEDURE — 1126F PR PAIN SEVERITY QUANTIFIED, NO PAIN PRESENT: ICD-10-PCS | Mod: S$GLB,,, | Performed by: NURSE PRACTITIONER

## 2020-10-06 PROCEDURE — 99203 PR OFFICE/OUTPT VISIT, NEW, LEVL III, 30-44 MIN: ICD-10-PCS | Mod: 25,S$GLB,, | Performed by: NURSE PRACTITIONER

## 2020-10-06 PROCEDURE — 1159F MED LIST DOCD IN RCRD: CPT | Mod: S$GLB,,, | Performed by: NURSE PRACTITIONER

## 2020-10-06 PROCEDURE — 99999 PR PBB SHADOW E&M-EST. PATIENT-LVL III: ICD-10-PCS | Mod: PBBFAC,,, | Performed by: NURSE PRACTITIONER

## 2020-10-06 PROCEDURE — 99203 OFFICE O/P NEW LOW 30 MIN: CPT | Mod: 25,S$GLB,, | Performed by: NURSE PRACTITIONER

## 2020-10-06 PROCEDURE — 1101F PT FALLS ASSESS-DOCD LE1/YR: CPT | Mod: CPTII,S$GLB,, | Performed by: NURSE PRACTITIONER

## 2020-10-06 PROCEDURE — 69210 REMOVE IMPACTED EAR WAX UNI: CPT | Mod: S$GLB,,, | Performed by: NURSE PRACTITIONER

## 2020-10-06 PROCEDURE — 69210 PR REMOVAL IMPACTED CERUMEN REQUIRING INSTRUMENTATION, UNILATERAL: ICD-10-PCS | Mod: S$GLB,,, | Performed by: NURSE PRACTITIONER

## 2020-10-06 PROCEDURE — 99999 PR PBB SHADOW E&M-EST. PATIENT-LVL III: CPT | Mod: PBBFAC,,, | Performed by: NURSE PRACTITIONER

## 2020-10-06 PROCEDURE — 1126F AMNT PAIN NOTED NONE PRSNT: CPT | Mod: S$GLB,,, | Performed by: NURSE PRACTITIONER

## 2020-10-06 PROCEDURE — 1101F PR PT FALLS ASSESS DOC 0-1 FALLS W/OUT INJ PAST YR: ICD-10-PCS | Mod: CPTII,S$GLB,, | Performed by: NURSE PRACTITIONER

## 2020-10-06 PROCEDURE — 1159F PR MEDICATION LIST DOCUMENTED IN MEDICAL RECORD: ICD-10-PCS | Mod: S$GLB,,, | Performed by: NURSE PRACTITIONER

## 2020-10-06 NOTE — PROGRESS NOTES
Subjective:       Patient ID: Summer Ch is a 87 y.o. female.    Chief Complaint: No chief complaint on file.    HPI   Patient was seen in ER 5 months ago for left otalgia; lavage done. Patient then had visit with Dr. Colbert who removed wax. Patient reports continued left ear bothering her. Left ear fullness and mild otalgia.     Review of Systems   Constitutional: Negative.    HENT: Positive for ear pain and hearing loss.    Eyes: Negative.    Respiratory: Negative.    Cardiovascular: Negative.    Gastrointestinal: Negative.    Musculoskeletal: Negative.    Integumentary:  Negative.   Neurological: Negative.    Hematological: Negative.    Psychiatric/Behavioral: Negative.          Objective:      Physical Exam  Vitals signs and nursing note reviewed.   Constitutional:       General: She is not in acute distress.     Appearance: She is well-developed. She is not ill-appearing or diaphoretic.   HENT:      Head: Normocephalic and atraumatic.      Right Ear: Hearing, tympanic membrane, ear canal and external ear normal. No middle ear effusion. Tympanic membrane is not erythematous.      Left Ear: Hearing, tympanic membrane, ear canal and external ear normal.  No middle ear effusion. Tympanic membrane is not erythematous.      Nose: Nose normal.      Mouth/Throat:      Pharynx: Uvula midline.   Eyes:      General: Lids are normal. No scleral icterus.        Right eye: No discharge.         Left eye: No discharge.   Neck:      Musculoskeletal: Normal range of motion and neck supple.      Trachea: Trachea normal. No tracheal deviation.   Cardiovascular:      Rate and Rhythm: Normal rate.   Pulmonary:      Effort: Pulmonary effort is normal. No respiratory distress.      Breath sounds: No stridor. No wheezing.   Musculoskeletal: Normal range of motion.   Skin:     General: Skin is warm and dry.      Coloration: Skin is not pale.   Neurological:      Mental Status: She is alert and oriented to person, place, and time.       Coordination: Coordination normal.      Gait: Gait normal.   Psychiatric:         Speech: Speech normal.         Behavior: Behavior normal. Behavior is cooperative.         Thought Content: Thought content normal.         Judgment: Judgment normal.         SEPARATE PROCEDURE IN OFFICE:   Procedure: Removal of impacted cerumen, bilateral   Pre Procedure Diagnosis: Cerumen Impaction   Post Procedure Diagnosis: Cerumen Impaction   Verbal informed consent in regards to risk of trauma to ear canal, ear drum or hearing, discomfort during procedure and/or inability to remove cerumen impaction in one session or unforeseen events or complications.   No anesthesia.     Procedure in detail:   Ear canal visualized bilateral with appropriate size ear speculum utilizing Operating Head Binocular Otomicroscope   Utilizing the following:  Ring curet, delicate alligator forceps, and/or suction cannula was used. The impacted cerumen of the ear canals was removed atraumatically. The TM and EAC were then inspected and found to be clear of wax. See description of TMs/EACs in PE above.   Complications: No   Condition: Improved/Good     Assessment:     Bilateral cerumen impactions removed    Negative aural exam  Plan:     If otalgia continues, may be musculoskeletal etiology    Reassured negative aural exam  Return to clinic as needed for any ENT concerns

## 2020-10-08 ENCOUNTER — CARE AT HOME (OUTPATIENT)
Dept: HOME HEALTH SERVICES | Facility: CLINIC | Age: 85
End: 2020-10-08
Payer: MEDICARE

## 2020-10-08 VITALS
DIASTOLIC BLOOD PRESSURE: 72 MMHG | OXYGEN SATURATION: 99 % | TEMPERATURE: 99 F | RESPIRATION RATE: 16 BRPM | SYSTOLIC BLOOD PRESSURE: 140 MMHG | BODY MASS INDEX: 20.31 KG/M2 | HEART RATE: 59 BPM | WEIGHT: 104 LBS

## 2020-10-08 DIAGNOSIS — H61.23 BILATERAL IMPACTED CERUMEN: ICD-10-CM

## 2020-10-08 DIAGNOSIS — C44.329 SQUAMOUS CELL CARCINOMA OF CHEEK: ICD-10-CM

## 2020-10-08 DIAGNOSIS — D49.6 BRAIN TUMOR: ICD-10-CM

## 2020-10-08 DIAGNOSIS — I87.2 EDEMA OF BOTH LOWER LEGS DUE TO PERIPHERAL VENOUS INSUFFICIENCY: ICD-10-CM

## 2020-10-08 DIAGNOSIS — R60.0 EDEMA OF BOTH LOWER LEGS DUE TO PERIPHERAL VENOUS INSUFFICIENCY: ICD-10-CM

## 2020-10-08 DIAGNOSIS — Z51.5 ENCOUNTER FOR PALLIATIVE CARE: Primary | ICD-10-CM

## 2020-10-08 PROCEDURE — 99350 PR HOME VISIT,ESTAB PATIENT,LEVEL IV: ICD-10-PCS | Mod: S$GLB,,, | Performed by: NURSE PRACTITIONER

## 2020-10-08 PROCEDURE — 1101F PR PT FALLS ASSESS DOC 0-1 FALLS W/OUT INJ PAST YR: ICD-10-PCS | Mod: CPTII,S$GLB,, | Performed by: NURSE PRACTITIONER

## 2020-10-08 PROCEDURE — 1126F AMNT PAIN NOTED NONE PRSNT: CPT | Mod: S$GLB,,, | Performed by: NURSE PRACTITIONER

## 2020-10-08 PROCEDURE — 1159F PR MEDICATION LIST DOCUMENTED IN MEDICAL RECORD: ICD-10-PCS | Mod: S$GLB,,, | Performed by: NURSE PRACTITIONER

## 2020-10-08 PROCEDURE — 1101F PT FALLS ASSESS-DOCD LE1/YR: CPT | Mod: CPTII,S$GLB,, | Performed by: NURSE PRACTITIONER

## 2020-10-08 PROCEDURE — 1126F PR PAIN SEVERITY QUANTIFIED, NO PAIN PRESENT: ICD-10-PCS | Mod: S$GLB,,, | Performed by: NURSE PRACTITIONER

## 2020-10-08 PROCEDURE — 1159F MED LIST DOCD IN RCRD: CPT | Mod: S$GLB,,, | Performed by: NURSE PRACTITIONER

## 2020-10-08 PROCEDURE — 99350 HOME/RES VST EST HIGH MDM 60: CPT | Mod: S$GLB,,, | Performed by: NURSE PRACTITIONER

## 2020-10-08 NOTE — PROGRESS NOTES
"[facility]  Palliative Care Home Visit    Visit Date: 10/8/2020  Encounter Provider: Gi Crow NP  PCP:  Kayla Pereira MD    Subjective:      Patient ID: Summer Ch is a 88 y.o. female.    Consult Requested By:  No ref. provider found  Reason for Consult: Palliative Care    Summer is being seen at home due to physical debility that presents a taxing effort to leave the home, to mitigate high risk of hospital readmission and/or due to the limited availability of reliable or safe options for transportation to the point of access to the provider. Prior to treatment on this visit the chart was reviewed and patient consent was obtained.         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      Summer is being seen today for a palliative care follow up visit. Daughter Diana is present again today. Summer is AAO x 4 with no complaints. She reports she is feeling "great" and has even been doing some gardening work. Her appetite is reported to be great and she denies any recent weight loss. Diana reports that her mother had a follow up appointment with Dr. Quinonez last month after she had an MRI to follow up on the status of her cancer. MRI was performed at [facility] so results are not available in [facility]. Diana reports everything is "stable" on the MRI. Summer does have some redness and swelling on her left cheek over the last 2-4 weeks per Diana. This is where the cancer began per Diana. Encouraged follow up with Dermatology for further evaluation. Summer denies any pain to the site.     Summer continues to have issues with cerumen impactions. She was recently seen at ENT and cerumen was removed. Encouraged follow up with ENT as recommended.     Both legs continue to be mildly swollen today and Diana expresses that the patient is doing too much and not elevating " her feet. Summer has a history of venous stasis ulcerations but skin is currently intact. Encouraged patient to stay off of her feet as much as possible, wear compression hoses which she has and monitor closely for breakdown.     VSS. Denies fever, chest pain, shortness of breath, nausea, vomiting, diarrhea. Denies any acute issues, concerns or complaints to address on today's visit. Reports taking all medications as prescribed. No other needs identified at this time. Risks of environmental exposure to coronavirus discussed including: social distancing, hand hygiene, and limiting departures from the home for necessities only.  Reports understanding and willingness to comply.          Goals of care  Patient does not have any Advanced Directives completed at this time. We discussed at length what options she has and she did state that she would want to be a DNR. Remaining in her home independently for as long as possible is what is desired. Delicia has not been able to discuss or review Advanced Directives still with other siblings. Comfort care was verbalized as desired but not filled out on [facility] form yet.        Review of Systems   Constitutional: Negative for weight loss, appetite change, activity change. Negative for chills/fever.   HENT: Left cheek with redness and mild swelling over last 2 weeks.  Eyes: Negative.    Respiratory: Negative.    Cardiovascular: Negative.    Gastrointestinal: Positive for constipation at times. Negative for abdominal distention, abdominal pain, diarrhea, nausea and vomiting.   Endocrine: Negative.    Genitourinary: Negative.    Musculoskeletal: Positive for gait problem (unsteady at times ).   Skin: intact, scattered bruises  Neurological: Negative for weakness and headaches. Negative for dizziness and seizures.   Hematological: Bruises/bleeds easily.   Psychiatric/Behavioral: Negative.          Assessments:  · Environmental: clean, adequate lighting and temperature, no foul  odors, lives alone children are with her daily.  · Functional Status: independent, uses cane, needs help with housework  · Safety: lives alone, home alone at night  · Nutritional: adequate access, appetite improving  · Home Health/DME/Supplies: No current Home Health, uses cane for ambulation           Symptom Assessment (ESAS 0-10 scale)      ESAS 0 1 2 3 4 5 6 7 8 9 10   Pain x                       Dyspnea x                       Anxiety x                       Nausea x                       Depression  x                       Anorexia x                       Fatigue   x                     Insomnia x                       Restlessness  x                       Agitation x                          Constipation    yes  Bowel Management Plan (BMP): yes  Diarrhea        no  Comments: uses MOM, will start Miralax     Performance Status: PPS Score 60  Karnofsky Score:  60  EGOC:  3     History:  Past Medical History:   Diagnosis Date    Cancer     skin cancer     Family History   Problem Relation Age of Onset    Alcohol abuse Mother     Stroke Father         Hemorrhagic     Past Surgical History:   Procedure Laterality Date    APPENDECTOMY      HYSTERECTOMY      SKIN CANCER EXCISION      x5    TONSILLECTOMY       Review of patient's allergies indicates:  No Known Allergies    Medications:    Current Outpatient Medications:     dorzolamide (TRUSOPT) 2 % ophthalmic solution, , Disp: , Rfl: 6    gabapentin (NEURONTIN) 100 MG capsule, Take 2 capsules (200 mg total) by mouth every evening., Disp: 180 capsule, Rfl: 1    LUMIGAN 0.01 % Drop, , Disp: , Rfl:     multivitamin (THERAGRAN) per tablet, Take 1 tablet by mouth once daily., Disp: , Rfl:     timolol maleate 0.5% (TIMOPTIC) 0.5 % Drop, , Disp: , Rfl: 6    acetaminophen (TYLENOL ORAL), Take 1 Dose by mouth 2 (two) times daily as needed., Disp: , Rfl:     24h Oral Morphine Equivalents (OME):  N/A    Objective:     Physical Exam:  Vitals:    10/08/20 1228    BP: (!) 140/72   Pulse: (!) 59   Resp: 16   Temp: 98.6 °F (37 °C)   TempSrc: Temporal   SpO2: 99%   Weight: 47.2 kg (104 lb)   PainSc: 0-No pain     Body mass index is 20.31 kg/m².    Physical Exam   Constitutional: She is oriented to person, place, and time. She appears well-developed and well-nourished yet thin and frail. No distress. Walking with a cane.  HENT:   Mouth/Throat: Oropharynx is clear and moist.   Eyes:   left eye has complete ptosis (unable to hold eyelid open at all)   Cardiovascular: Normal rate and intact distal pulses.   Murmur: faint systolic murmur at base of heart.  predominantly regular rate with some irregular beats   Pulmonary/Chest: Effort normal and breath sounds normal. No respiratory distress. She has no wheezes. She has no rales.   Musculoskeletal: She exhibits 2+ non-pitting edema to BLE, no ulcerations.  Neurological: She is alert and oriented to person, place, and time.   Skin: Skin is warm and dry. She is not diaphoretic.   left central cheek with erythema and mild edema        Psychiatric: She has a normal mood and affect. Her behavior is normal. Judgment and thought content normal.         Advance Care Planning   Ethical / Legal: Advance Care Planning   · Surrogate decision maker:  Roney Nesbitt, Relationship: daughter  · Code Status: DNR  · [place]:  Form left with patient and daughter for review, they would like to discuss it with other children  · Other advance directive:  none, Capacity to make medical decisions:  intact, Conflict: none       Labs:  CBC:   WBC   Date Value Ref Range Status   12/31/2019 9.16 3.90 - 12.70 K/uL Final       Hemoglobin   Date Value Ref Range Status   12/31/2019 12.6 12.0 - 16.0 g/dL Final       Hematocrit   Date Value Ref Range Status   12/31/2019 41.5 37.0 - 48.5 % Final       Mean Corpuscular Volume   Date Value Ref Range Status   12/31/2019 102 (H) 82 - 98 fL Final       Platelets   Date Value Ref Range Status   12/31/2019 327 150 - 350 K/uL  Final       LFT:   Lab Results   Component Value Date    AST 21 12/31/2019    ALKPHOS 83 12/31/2019    BILITOT 0.4 12/31/2019       Albumin:   Albumin   Date Value Ref Range Status   12/31/2019 3.9 3.5 - 5.2 g/dL Final     Protein:   Total Protein   Date Value Ref Range Status   12/31/2019 7.1 6.0 - 8.4 g/dL Final       Radiology:  I have reviewed all pertinent imaging results/findings within the past 24 hours.      Assessment:     1. Encounter for palliative care    2. Squamous cell carcinoma of cheek    3. Brain tumor    4. Edema of both lower legs due to peripheral venous insufficiency    5. Bilateral impacted cerumen        Plan:   Summer was seen today for follow-up.    Diagnoses and all orders for this visit:    Encounter for palliative care    Squamous cell carcinoma of cheek  -     Ambulatory referral/consult to Dermatology; Future  Follow up with Dr. Marques for new area of redness/swelling on left cheek.  Brain tumor  Stable. Treated with XRT earlier this year. Keep follow up with Dr. Quinonez.  Edema of both lower legs due to peripheral venous insufficiency  Chronic and stable. Follow low sodium diet. Keep feet elevated while seated. Wear compression hose. Monitor skin for breakdown.   Bilateral impacted cerumen  Chronic. Keep follow up appt with ENT.       Were controlled substances prescribed?  No    > 50% of 60 min visit spent in chart review, face to face discussion of goals of care,  symptom assessment, coordination of care and emotional support. Topics discussed today: BLE edema, cerumen impaction, squamous cell cancer, brain tumor, fall prevention.     Follow Up Appointments:   No future appointments.Verbal consent for visit obtained from patient today.     Signature:  Gi Crow NP     Attestation:   Screening criteria to assess the level of the patient's risk for infection with COVID-19 as recommended by the CDC at the time of the above documented home visit concluded appropriateness to  proceed. Universal precautions were maintained at all times, including provider wearing a mask and gloves during entire visit and use of 60% alcohol gel hand  immediately prior to entry and upon departure of patient's home as well as cleaning of equipment used in home visit with antibacterial/germicidal disposable wipes.

## 2020-10-11 PROBLEM — H61.23 BILATERAL IMPACTED CERUMEN: Status: ACTIVE | Noted: 2020-10-11

## 2020-10-11 NOTE — PATIENT INSTRUCTIONS
Patient Instructions:  - [facility] Nurse Practitioner to schedule home follow-up visit with patient in 6-8 weeks or as needed.  - Continue all medications, treatments and therapies as ordered.   - Follow all instructions, recommendations as discussed.  - Maintain Safety Precautions at all times.  - Attend all medical appointments as scheduled.  - For worsening symptoms: call Primary Care Physician or Nurse Practitioner.  - For emergencies, call 911 or immediately report to the nearest emergency room.  - Limit Risks of environmental exposure to coronavirus/COVID-19 as discussed including: social distancing, hand hygiene, and limiting departures from the home for necessities only.         Impacted Earwax    Impacted earwax is a buildup of the natural wax in the ear (cerumen). Impacted earwax is very common. It can cause symptoms such as hearing loss. It can also stop a doctor doing an exam of your ear.  Understanding earwax  Tiny glands in your ear make substances that combine with dead skin cells to form earwax. Earwax helps protect your ear canal from water, dirt, infection, and injury. Over time, earwax travels from the inner part of your ear canal to the entrance of the canal. Then it falls away naturally. But in some cases, it cant travel to the entrance of the canal. This may be because of a health condition or objects put in the ear. With age, earwax tends to become harder and less fluid. Older adults are more likely to have problems with earwax buildup.  What causes impacted earwax?  Earwax can build up because of many health conditions. Some cause a physical blockage. Others cause too much earwax to be made. Health conditions that can cause earwax buildup include:  · Bony blockage in the ear (osteoma or exostoses)  · Infections, such as swimmers ear (external otitis)  · Skin disease, such as eczema  · Autoimmune diseases, such as lupus  · A narrowed ear canal from birth, chronic inflammation, or injury  · Too  much earwax because of injury  · Too much earwax because of  water in the ear canal  Objects repeatedly placed in the ear can also cause impacted earwax. For example, putting cotton swabs in the ear may push the wax deeper into the ear. Over time, this may cause blockage. Hearing aids, swimming plugs, and swim molds can cause the same problem when used again and again.  In some cases, the cause of impacted earwax is not known.  Symptoms of impacted earwax  Excess earwax usually does not cause any symptoms, unless there is a large amount of buildup. Then it may cause symptoms such as:  · Hearing loss  · Earache  · Sense of ear fullness  · Itching in the ear  · Dizziness  · Ringing in the ears  · Cough  Treatment for impacted earwax  If you dont have symptoms, you may not need treatment. Often the earwax goes away on its own with time. If you have symptoms, you may have 1 or more treatments such as:  · Ear drops. These help to soften the earwax. This helps it leave the ear over time.  · Rinsing (irrigation) of the ear canal with water. This is done in a doctors office.  · Removal of the earwax with small tools. This is also done in a doctors office.  In rare cases, some treatments for earwax removal may cause complications such as:  · Swimmers ear (otitis external)  · Earache  · Short-term hearing loss  · Dizziness  · Water trapped in the ear canal  · Hole in the eardrum  · Ringing in the ears  · Bleeding from the ear  Talk with your health care provider about which risks apply most to you.  Dont use these at home  Health care providers do not advise use of ear candles or ear vacuum kits. These methods are not shown to work.   Preventing impacted earwax  You may not be able to prevent impacted earwax if you have a health condition that causes it, such as eczema. In other cases, you may be able to prevent earwax buildup by:  · Using ear drops once a week  · Having routine cleaning of the ear about every 6  months  · Not using cotton swabs in the ear  When to call the health care provider  Call your health care provider right away if you have severe symptoms after earwax removal. These may include bleeding or severe ear pain.   Date Last Reviewed: 3/19/2015  © 8685-4966 [employer]. [address] [place] [address]. All rights reserved. This information is not intended as a substitute for professional medical care. Always follow your healthcare professional's instructions.

## 2020-10-19 ENCOUNTER — PATIENT MESSAGE (OUTPATIENT)
Dept: DERMATOLOGY | Facility: CLINIC | Age: 85
End: 2020-10-19

## 2020-10-19 ENCOUNTER — PATIENT MESSAGE (OUTPATIENT)
Dept: FAMILY MEDICINE | Facility: CLINIC | Age: 85
End: 2020-10-19

## 2020-10-20 ENCOUNTER — OFFICE VISIT (OUTPATIENT)
Dept: DERMATOLOGY | Facility: CLINIC | Age: 85
End: 2020-10-20
Payer: MEDICARE

## 2020-10-20 VITALS — BODY MASS INDEX: 20.42 KG/M2 | WEIGHT: 104 LBS | HEIGHT: 60 IN

## 2020-10-20 DIAGNOSIS — C44.329 SQUAMOUS CELL CARCINOMA OF CHEEK: Primary | ICD-10-CM

## 2020-10-20 PROCEDURE — 99999 PR PBB SHADOW E&M-EST. PATIENT-LVL III: ICD-10-PCS | Mod: PBBFAC,,, | Performed by: DERMATOLOGY

## 2020-10-20 PROCEDURE — 1101F PR PT FALLS ASSESS DOC 0-1 FALLS W/OUT INJ PAST YR: ICD-10-PCS | Mod: CPTII,S$GLB,, | Performed by: DERMATOLOGY

## 2020-10-20 PROCEDURE — 99213 OFFICE O/P EST LOW 20 MIN: CPT | Mod: S$GLB,,, | Performed by: DERMATOLOGY

## 2020-10-20 PROCEDURE — 1159F MED LIST DOCD IN RCRD: CPT | Mod: S$GLB,,, | Performed by: DERMATOLOGY

## 2020-10-20 PROCEDURE — 99999 PR PBB SHADOW E&M-EST. PATIENT-LVL III: CPT | Mod: PBBFAC,,, | Performed by: DERMATOLOGY

## 2020-10-20 PROCEDURE — 1101F PT FALLS ASSESS-DOCD LE1/YR: CPT | Mod: CPTII,S$GLB,, | Performed by: DERMATOLOGY

## 2020-10-20 PROCEDURE — 1159F PR MEDICATION LIST DOCUMENTED IN MEDICAL RECORD: ICD-10-PCS | Mod: S$GLB,,, | Performed by: DERMATOLOGY

## 2020-10-20 PROCEDURE — 99213 PR OFFICE/OUTPT VISIT, EST, LEVL III, 20-29 MIN: ICD-10-PCS | Mod: S$GLB,,, | Performed by: DERMATOLOGY

## 2020-10-20 NOTE — PROGRESS NOTES
"  Subjective:       Patient ID:  Summer Ch is a 88 y.o. female who presents for   Chief Complaint   Patient presents with    Lesion     87 y/o F present for follow up. Last OV with me 2/2020.     She was diagnosed with metastatic SCC (They think she had a lesion biopsied in the past (and removed although they can't recall how long ago) and states lesion "did not heal completely").     She completed 15 XRT treatments (March/April 2020).  2 MRIs 3 months apart show that the tumor has decreased in size. Last MRI 9/2020          Derm Hx: multiple BCCs and SCCs removed from her face, Dec 2019       Past Medical History:   Diagnosis Date    Cancer     skin cancer       Review of Systems   Constitutional: Negative for fever and chills.   HENT: Negative for sore throat.    Respiratory: Negative for cough.    Gastrointestinal: Negative for nausea and vomiting.   Skin: Positive for dry skin. Negative for itching and rash.        Objective:    Physical Exam   Constitutional: She appears well-developed and well-nourished.   Neurological: She is alert and oriented to person, place, and time.   Psychiatric: She has a normal mood and affect.   Skin:   Areas Examined (abnormalities noted in diagram):   Scalp / Hair Palpated and Inspected  Head / Face Inspection Performed  Neck Inspection Performed           Biopsy site 2/2020       10/2020        Diagram Legend     Erythematous scaling macule/papule c/w actinic keratosis       Vascular papule c/w angioma      Pigmented verrucoid papule/plaque c/w seborrheic keratosis      Yellow umbilicated papule c/w sebaceous hyperplasia      Irregularly shaped tan macule c/w lentigo     1-2 mm smooth white papules consistent with Milia      Movable subcutaneous cyst with punctum c/w epidermal inclusion cyst      Subcutaneous movable cyst c/w pilar cyst      Firm pink to brown papule c/w dermatofibroma      Pedunculated fleshy papule(s) c/w skin tag(s)      Evenly pigmented macule c/w " junctional nevus     Mildly variegated pigmented, slightly irregular-bordered macule c/w mildly atypical nevus      Flesh colored to evenly pigmented papule c/w intradermal nevus       Pink pearly papule/plaque c/w basal cell carcinoma      Erythematous hyperkeratotic cursted plaque c/w SCC      Surgical scar with no sign of skin cancer recurrence      Open and closed comedones      Inflammatory papules and pustules      Verrucoid papule consistent consistent with wart     Erythematous eczematous patches and plaques     Dystrophic onycholytic nail with subungual debris c/w onychomycosis     Umbilicated papule    Erythematous-base heme-crusted tan verrucoid plaque consistent with inflamed seborrheic keratosis     Erythematous Silvery Scaling Plaque c/w Psoriasis     See annotation      Assessment / Plan:        Squamous cell carcinoma of cheek    with Metastasis; s/p treatment with XRT  Her L cheek is well healed  Will continue to monitor  Her family will notify me if anything changes           Follow up if symptoms worsen or fail to improve.

## 2020-12-03 ENCOUNTER — CARE AT HOME (OUTPATIENT)
Dept: HOME HEALTH SERVICES | Facility: CLINIC | Age: 85
End: 2020-12-03
Payer: MEDICARE

## 2020-12-03 DIAGNOSIS — C44.329 SQUAMOUS CELL CARCINOMA OF CHEEK: Primary | ICD-10-CM

## 2020-12-03 DIAGNOSIS — D49.6 BRAIN TUMOR: ICD-10-CM

## 2020-12-03 DIAGNOSIS — Z51.5 ENCOUNTER FOR PALLIATIVE CARE: ICD-10-CM

## 2020-12-03 PROCEDURE — 99350 HOME/RES VST EST HIGH MDM 60: CPT | Mod: S$GLB,,, | Performed by: NURSE PRACTITIONER

## 2020-12-03 PROCEDURE — 1159F MED LIST DOCD IN RCRD: CPT | Mod: S$GLB,,, | Performed by: NURSE PRACTITIONER

## 2020-12-03 PROCEDURE — 1159F PR MEDICATION LIST DOCUMENTED IN MEDICAL RECORD: ICD-10-PCS | Mod: S$GLB,,, | Performed by: NURSE PRACTITIONER

## 2020-12-03 PROCEDURE — 99350 PR HOME VISIT,ESTAB PATIENT,LEVEL IV: ICD-10-PCS | Mod: S$GLB,,, | Performed by: NURSE PRACTITIONER

## 2020-12-03 NOTE — PROGRESS NOTES
"[facility]  Palliative Care Home Visit    Visit Date: 12/3/2020  Encounter Provider: Gi Crow NP  PCP:  Kayla Pereira MD    Subjective:      Patient ID: Summer Ch is a 88 y.o. female.    Consult Requested By:  Dr. Pereira  Reason for Consult:  Palliative Care    Summer is being seen at home due to physical debility that presents a taxing effort to leave the home, to mitigate high risk of hospital readmission and/or due to the limited availability of reliable or safe options for transportation to the point of access to the provider. Prior to treatment on this visit the chart was reviewed and patient consent was obtained.  .      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      Summer is being seen today for a palliative care follow up visit. Daughter Diana is present again today. Summer is AAO x 4 with no complaints. She reports she is feeling "great" and has even been doing some gardening and cooking lately. Her appetite is reported to be great and she denies any recent weight loss. Summer last had an MRI 9/2020 which showed decrease in brain tumor size. Next MRI 1/4/2021. Summer saw Dermatology recently-left cheek was with dry skin but no new lesions reported.      Summer has no edema to BLE today as she has had on previous visits.      VSS. Denies fever, chest pain, shortness of breath, nausea, vomiting, diarrhea. Denies any acute issues, concerns or complaints to address on today's visit. Reports taking all medications as prescribed. No other needs identified at this time. Risks of environmental exposure to coronavirus discussed including: social distancing, hand hygiene, and limiting departures from the home for necessities only.  Reports understanding and willingness to comply.          Goals of care  Patient does not have any Advanced " Directives completed at this time. We discussed at length what options she has and she did state that she would want to be a DNR. Remaining in her home independently for as long as possible is what is desired. Delicia has not been able to discuss or review Advanced Directives still with other siblings. Comfort care was verbalized as desired but not filled out on [facility] form yet.        Review of Systems   Constitutional: Negative for weight loss, appetite change, activity change. Negative for chills/fever.   HENT: Dry skin on face.  Eyes: Negative.    Respiratory: Negative.    Cardiovascular: Negative.    Gastrointestinal: Positive for constipation at times. Negative for abdominal distention, abdominal pain, diarrhea, nausea and vomiting.   Endocrine: Negative.    Genitourinary: Negative.    Musculoskeletal: Positive for gait problem (unsteady at times ).   Skin: intact, scattered bruises  Neurological: Negative for weakness and headaches. Negative for dizziness and seizures.   Hematological: Bruises/bleeds easily.   Psychiatric/Behavioral: Negative.          Assessments:  · Environmental: clean, adequate lighting and temperature, no foul odors, lives alone children are with her daily.  · Functional Status: independent, uses cane, needs help with housework  · Safety: lives alone, home alone at night  · Nutritional: adequate access, appetite improving  · Home Health/DME/Supplies: No current Home Health, uses cane for ambulation           Symptom Assessment (ESAS 0-10 scale)      ESAS 0 1 2 3 4 5 6 7 8 9 10   Pain x                       Dyspnea x                       Anxiety x                       Nausea x                       Depression  x                       Anorexia x                       Fatigue   x                     Insomnia x                       Restlessness  x                       Agitation x                          Constipation    yes  Bowel Management Plan  (BMP): yes  Diarrhea        no  Comments: uses MOM, will start Miralax     Performance Status: PPS Score 60  Karnofsky Score:  60  EGOC:  3     History:  Past Medical History:   Diagnosis Date    Cancer     skin cancer     Family History   Problem Relation Age of Onset    Alcohol abuse Mother     Stroke Father         Hemorrhagic     Past Surgical History:   Procedure Laterality Date    APPENDECTOMY      HYSTERECTOMY      SKIN CANCER EXCISION      x5    TONSILLECTOMY       Review of patient's allergies indicates:  No Known Allergies    Medications:    Current Outpatient Medications:     acetaminophen (TYLENOL ORAL), Take 1 Dose by mouth 2 (two) times daily as needed., Disp: , Rfl:     dorzolamide (TRUSOPT) 2 % ophthalmic solution, , Disp: , Rfl: 6    gabapentin (NEURONTIN) 100 MG capsule, Take 2 capsules (200 mg total) by mouth every evening., Disp: 180 capsule, Rfl: 1    LUMIGAN 0.01 % Drop, , Disp: , Rfl:     multivitamin (THERAGRAN) per tablet, Take 1 tablet by mouth once daily., Disp: , Rfl:     timolol maleate 0.5% (TIMOPTIC) 0.5 % Drop, , Disp: , Rfl: 6    24h Oral Morphine Equivalents (OME):  N/A    Objective:     Physical Exam:  Vitals:    12/03/20 1140   BP: 122/70   Pulse: 66   Resp: 16   Temp: 98.6 °F (37 °C)   TempSrc: Temporal   SpO2: 98%   Weight: 48.5 kg (107 lb)   PainSc: 0-No pain     Body mass index is 20.9 kg/m².    Physical Exam   Constitutional: She is oriented to person, place, and time. She appears well-developed and well-nourished yet thin and frail. No distress. Walking with a cane.  HENT:   Mouth/Throat: Oropharynx is clear and moist.   Eyes:   left eye has complete ptosis (unable to hold eyelid open at all)   Cardiovascular: Normal rate and intact distal pulses.   Murmur: faint systolic murmur at base of heart.  predominantly regular rate with some irregular beats   Pulmonary/Chest: Effort normal and breath sounds normal. No respiratory distress. She has no wheezes. She has no  rales.   Musculoskeletal: She has no edema today. Good ROM.   Neurological: She is alert and oriented to person, place, and time.   Skin: Skin is warm and dry. She is not diaphoretic.   Psychiatric: She has a normal mood and affect. Her behavior is normal. Judgment and thought content normal.         Advance Care Planning   Ethical / Legal: Advance Care Planning   · Surrogate decision maker:  Roney Nesbitt, Relationship: daughter  · Code Status: DNR  · [place]:  Form left with patient and daughter for review, they would like to discuss it with other children  · Other advance directive:  none, Capacity to make medical decisions:  intact, Conflict: none         Labs:  CBC:   WBC   Date Value Ref Range Status   12/31/2019 9.16 3.90 - 12.70 K/uL Final   te  Hemoglobin   Date Value Ref Range Status   12/31/2019 12.6 12.0 - 16.0 g/dL Final      Hematocrit   Date Value Ref Range Status   12/31/2019 41.5 37.0 - 48.5 % Final                              Albumin:   Albumin   Date Value Ref Range Status   12/31/2019 3.9 3.5 - 5.2 g/dL Final     Protein:   Total Protein   Date Value Ref Range Status   12/31/2019 7.1 6.0 - 8.4 g/dL Final       Radiology:  I have reviewed all pertinent imaging results/findings within the past 24 hours.      Assessment:     1. Squamous cell carcinoma of cheek    2. Encounter for palliative care    3. Brain tumor        Plan:   Summer was seen today for follow-up.    Diagnoses and all orders for this visit:    Squamous cell carcinoma of cheek    Encounter for palliative care    Brain tumor      Problem List Items Addressed This Visit        Oncology    Brain tumor     Chronic. Treated with 15 XRT March/April 2020.  Last MRI 9/2020 showed tumor had decreased in size.  Followed by Heme/Onc (Dr. Quinonez) and Dermatology (Dr. Marques).  Next MRI 1/4/2021.         Squamous cell carcinoma of cheek - Primary     Chronic. Treated with 15 XRT March/April 2020.  Last MRI 9/2020 showed brain tumor had decreased  in size.  Followed by Heme/Onc (Dr. Quinonez) and Dermatology (Dr. Marques).  Next MRI 1/4/2021.            Other    Encounter for palliative care     DNR. Advanced directives not completed at this time. Forms have been reviewed with patient and daughter Darleen. Forms left for review with other family members.               Were controlled substances prescribed?  No    > 50% of 60 min visit spent in chart review, face to face discussion of goals of care,  symptom assessment, coordination of care and emotional support. Topics discussed today: squamous cell cancer, brain tumor, fall prevention.        Follow Up Appointments:   No future appointments.    Verbal consent for visit obtained from patient today.     Signature:  Gi Crow NP     Attestation:   Screening criteria to assess the level of the patient's risk for infection with COVID-19 as recommended by the CDC at the time of the above documented home visit concluded appropriateness to proceed. Universal precautions were maintained at all times, including provider wearing a mask and gloves during entire visit and use of 60% alcohol gel hand  immediately prior to entry and upon departure of patient's home as well as cleaning of equipment used in home visit with antibacterial/germicidal disposable wipes.

## 2020-12-06 VITALS
RESPIRATION RATE: 16 BRPM | SYSTOLIC BLOOD PRESSURE: 122 MMHG | BODY MASS INDEX: 20.9 KG/M2 | TEMPERATURE: 99 F | WEIGHT: 107 LBS | OXYGEN SATURATION: 98 % | DIASTOLIC BLOOD PRESSURE: 70 MMHG | HEART RATE: 66 BPM

## 2020-12-07 NOTE — ASSESSMENT & PLAN NOTE
Chronic. Treated with 15 XRT March/April 2020.  Last MRI 9/2020 showed tumor had decreased in size.  Followed by Heme/Onc (Dr. Quinonez) and Dermatology (Dr. Marques).  Next MRI 1/4/2021.

## 2020-12-07 NOTE — ASSESSMENT & PLAN NOTE
DNR. Advanced directives not completed at this time. Forms have been reviewed with patient and daughter Darleen. Forms left for review with other family members.

## 2020-12-07 NOTE — PATIENT INSTRUCTIONS
Patient Instructions:  - [facility] Nurse Practitioner to schedule home follow-up visit with patient in 8 weeks or as needed.  - Continue all medications, treatments and therapies as ordered.   - Follow all instructions, recommendations as discussed.  - Maintain Safety Precautions at all times.  - Attend all medical appointments as scheduled.  - For worsening symptoms: call Primary Care Physician or Nurse Practitioner.  - For emergencies, call 911 or immediately report to the nearest emergency room.  - Limit Risks of environmental exposure to coronavirus/COVID-19 as discussed including: social distancing, hand hygiene, and limiting departures from the home for necessities only.       Fall Prevention  Falls often occur due to slipping, tripping or losing your balance. Millions of people fall every year and injure themselves. Here are ways to reduce your risk of falling again.  · Think about your fall, was there anything that caused your fall that can be fixed, removed, or replaced?  · Make your home safe by keeping walkways clear of objects you may trip over.  · Use non-slip pads under rugs. Do not use area rugs or small throw rugs.  · Use non-slip mats in bathtubs and showers.  · Install handrails and lights on staircases.  · Do not walk in poorly lit areas.  · Do not stand on chairs or wobbly ladders.  · Use caution when reaching overhead or looking upward. This position can cause a loss of balance.  · Be sure your shoes fit properly, have non-slip bottoms and are in good condition.   · Wear shoes both inside and out. Avoid going barefoot or wearing slippers.  · Be cautious when going up and down stairs, curbs, and when walking on uneven sidewalks.  · If your balance is poor, consider using a cane or walker.  · If your fall was related to alcohol use, stop or limit alcohol intake.   · If your fall was related to use of sleeping medicines, talk to your doctor about this. You may need to reduce your dosage at bedtime if  you awaken during the night to go to the bathroom.    · To reduce the need for nighttime bathroom trips:  ¨ Avoid drinking fluids for several hours before going to bed  ¨ Empty your bladder before going to bed  ¨ Men can keep a urinal at the bedside  · Stay as active as you can. Balance, flexibility, strength, and endurance all come from exercise. They all play a role in preventing falls. Ask your healthcare provider which types of activity are right for you.  · Get your vision checked on a regular basis.  · If you have pets, know where they are before you stand up or walk so you don't trip over them.  · Use night lights.  Date Last Reviewed: 11/5/2015  © 0417-8202 [employer]. [address] [place] [address], [place] [address]. All rights reserved. This information is not intended as a substitute for professional medical care. Always follow your healthcare professional's instructions.        Types of Skin Cancer  Skin cancer is a serious disease that can affect anyone. It is the most common form of cancer in the [place] If caught early, skin cancer can often be treated with success. But in some cases, it is life-threatening. To play it safe, talk with your healthcare provider about doing monthly skin checkups. If you see any changes in your skin, contact your doctor right away.   Basal cell cancer is the most common skin cancer. Areas of cancer (lesions) often appear on the face, ears, neck, trunk, or arms. Varying in color, these lesions may be waxy, pearly, scaly, or scarlike. Tiny blood vessels may be seen through the lesions surface. These lesions sometime bleed easily and might not heal well.  Melanoma is less common, but much more dangerous type of skin cancer. This is because it is more likely to grow and spread than basal or squamous cell cancers.. It is often not easy to tell where a melanoma lesions borders are. It is often brown or black, but it may be mixed in color. The shape and size of melanoma  lesions tend to differ from one side to the other.  Squamous cell cancer is also a common type of skin cancer. Lesions often form on the face, ears, neck, hands, or arms. The lesions are firm, red bumps or flat, scaly, crusty growths.  Peterss disease is an early stage of squamous cell cancer. The lesions are usually  red, crusty, scaly growths with well-defined borders.  There are other types of skin cancer as well. These include Merkel cell cancer and skin (cutaneous) lymphomas, but these cancers are rare.  A precancerous skin change  Actinic keratosis is not skin cancer. It is a common, precancerous skin change that can turn into a squamous cell skin cancer if left untreated over a long period of time. Actinic keratosis lesions tend to appear on sun-exposed parts of the body. They can be pink, reddish-brown, or skin-colored. These lesions are most often raised, scaly, and rough, like sandpaper. In some cases, actinic keratosis lesions are painful. Getting early treatment for actinic keratoses almost always cures the lesions.  Date Last Reviewed: 1/1/2017  © 3438-8118 [employer]. [address] [place] [address], [place] [address]. All rights reserved. This information is not intended as a substitute for professional medical care. Always follow your healthcare professional's instructions.

## 2020-12-07 NOTE — ASSESSMENT & PLAN NOTE
Chronic. Treated with 15 XRT March/April 2020.  Last MRI 9/2020 showed brain tumor had decreased in size.  Followed by Heme/Onc (Dr. Quinonez) and Dermatology (Dr. Marques).  Next MRI 1/4/2021.

## 2021-01-13 ENCOUNTER — PATIENT MESSAGE (OUTPATIENT)
Dept: FAMILY MEDICINE | Facility: CLINIC | Age: 86
End: 2021-01-13

## 2021-01-13 DIAGNOSIS — M79.2 NEUROPATHIC PAIN: ICD-10-CM

## 2021-01-13 RX ORDER — GABAPENTIN 100 MG/1
200 CAPSULE ORAL NIGHTLY
Qty: 180 CAPSULE | Refills: 1 | Status: SHIPPED | OUTPATIENT
Start: 2021-01-13 | End: 2021-07-19 | Stop reason: SDUPTHER

## 2021-01-22 ENCOUNTER — PATIENT MESSAGE (OUTPATIENT)
Dept: ADMINISTRATIVE | Facility: OTHER | Age: 86
End: 2021-01-22

## 2021-02-03 ENCOUNTER — CARE AT HOME (OUTPATIENT)
Dept: HOME HEALTH SERVICES | Facility: CLINIC | Age: 86
End: 2021-02-03
Payer: MEDICARE

## 2021-02-03 VITALS
HEART RATE: 69 BPM | BODY MASS INDEX: 21.29 KG/M2 | TEMPERATURE: 98 F | SYSTOLIC BLOOD PRESSURE: 120 MMHG | RESPIRATION RATE: 16 BRPM | WEIGHT: 109 LBS | OXYGEN SATURATION: 98 % | DIASTOLIC BLOOD PRESSURE: 66 MMHG

## 2021-02-03 DIAGNOSIS — D49.6 BRAIN TUMOR: ICD-10-CM

## 2021-02-03 DIAGNOSIS — D69.2 SENILE PURPURA: ICD-10-CM

## 2021-02-03 DIAGNOSIS — H40.1133 PRIMARY OPEN ANGLE GLAUCOMA (POAG) OF BOTH EYES, SEVERE STAGE: ICD-10-CM

## 2021-02-03 DIAGNOSIS — M79.2 NEUROPATHIC PAIN: Primary | ICD-10-CM

## 2021-02-03 DIAGNOSIS — C44.329 SQUAMOUS CELL CARCINOMA OF CHEEK: ICD-10-CM

## 2021-02-03 DIAGNOSIS — Z51.5 ENCOUNTER FOR PALLIATIVE CARE: ICD-10-CM

## 2021-02-03 DIAGNOSIS — H61.23 BILATERAL IMPACTED CERUMEN: ICD-10-CM

## 2021-02-03 PROCEDURE — 1159F PR MEDICATION LIST DOCUMENTED IN MEDICAL RECORD: ICD-10-PCS | Mod: S$GLB,,, | Performed by: NURSE PRACTITIONER

## 2021-02-03 PROCEDURE — 1159F MED LIST DOCD IN RCRD: CPT | Mod: S$GLB,,, | Performed by: NURSE PRACTITIONER

## 2021-02-03 PROCEDURE — 99350 PR HOME VISIT,ESTAB PATIENT,LEVEL IV: ICD-10-PCS | Mod: S$GLB,,, | Performed by: NURSE PRACTITIONER

## 2021-02-03 PROCEDURE — 99350 HOME/RES VST EST HIGH MDM 60: CPT | Mod: S$GLB,,, | Performed by: NURSE PRACTITIONER

## 2021-02-07 PROBLEM — D69.2 SENILE PURPURA: Status: ACTIVE | Noted: 2021-02-07

## 2021-02-07 PROBLEM — H40.9 GLAUCOMA: Status: ACTIVE | Noted: 2021-02-07

## 2021-02-07 PROBLEM — H40.10X3 OPEN-ANGLE GLAUCOMA OF BOTH EYES, SEVERE STAGE: Status: ACTIVE | Noted: 2021-02-07

## 2021-02-08 ENCOUNTER — PATIENT MESSAGE (OUTPATIENT)
Dept: FAMILY MEDICINE | Facility: CLINIC | Age: 86
End: 2021-02-08

## 2021-04-29 ENCOUNTER — CARE AT HOME (OUTPATIENT)
Dept: HOME HEALTH SERVICES | Facility: CLINIC | Age: 86
End: 2021-04-29
Payer: MEDICARE

## 2021-04-29 VITALS
RESPIRATION RATE: 16 BRPM | DIASTOLIC BLOOD PRESSURE: 68 MMHG | BODY MASS INDEX: 20.96 KG/M2 | HEART RATE: 65 BPM | SYSTOLIC BLOOD PRESSURE: 118 MMHG | WEIGHT: 111 LBS | TEMPERATURE: 98 F | HEIGHT: 61 IN | OXYGEN SATURATION: 97 %

## 2021-04-29 DIAGNOSIS — H40.1133 PRIMARY OPEN ANGLE GLAUCOMA (POAG) OF BOTH EYES, SEVERE STAGE: ICD-10-CM

## 2021-04-29 DIAGNOSIS — C44.329 SQUAMOUS CELL CARCINOMA OF CHEEK: ICD-10-CM

## 2021-04-29 DIAGNOSIS — M79.2 NEUROPATHIC PAIN: Primary | ICD-10-CM

## 2021-04-29 DIAGNOSIS — Z51.5 ENCOUNTER FOR PALLIATIVE CARE: ICD-10-CM

## 2021-04-29 DIAGNOSIS — D49.6 BRAIN TUMOR: ICD-10-CM

## 2021-04-29 PROCEDURE — 1159F PR MEDICATION LIST DOCUMENTED IN MEDICAL RECORD: ICD-10-PCS | Mod: S$GLB,,, | Performed by: NURSE PRACTITIONER

## 2021-04-29 PROCEDURE — 1159F MED LIST DOCD IN RCRD: CPT | Mod: S$GLB,,, | Performed by: NURSE PRACTITIONER

## 2021-04-29 PROCEDURE — 99349 PR HOME VISIT,ESTAB PATIENT,LEVEL III: ICD-10-PCS | Mod: S$GLB,,, | Performed by: NURSE PRACTITIONER

## 2021-04-29 PROCEDURE — 99349 HOME/RES VST EST MOD MDM 40: CPT | Mod: S$GLB,,, | Performed by: NURSE PRACTITIONER

## 2021-05-06 ENCOUNTER — PATIENT MESSAGE (OUTPATIENT)
Dept: RESEARCH | Facility: HOSPITAL | Age: 86
End: 2021-05-06

## 2021-05-26 ENCOUNTER — CARE AT HOME (OUTPATIENT)
Dept: HOME HEALTH SERVICES | Facility: CLINIC | Age: 86
End: 2021-05-26
Payer: MEDICARE

## 2021-05-26 DIAGNOSIS — Z51.5 ENCOUNTER FOR PALLIATIVE CARE: ICD-10-CM

## 2021-05-26 DIAGNOSIS — Z91.81 HISTORY OF RECENT FALL: ICD-10-CM

## 2021-05-26 DIAGNOSIS — M79.2 NEUROPATHIC PAIN: Primary | ICD-10-CM

## 2021-05-26 DIAGNOSIS — R07.81 RIB PAIN: ICD-10-CM

## 2021-05-26 PROCEDURE — 99350 PR HOME VISIT,ESTAB PATIENT,LEVEL IV: ICD-10-PCS | Mod: S$GLB,,, | Performed by: NURSE PRACTITIONER

## 2021-05-26 PROCEDURE — 1159F PR MEDICATION LIST DOCUMENTED IN MEDICAL RECORD: ICD-10-PCS | Mod: S$GLB,,, | Performed by: NURSE PRACTITIONER

## 2021-05-26 PROCEDURE — 1159F MED LIST DOCD IN RCRD: CPT | Mod: S$GLB,,, | Performed by: NURSE PRACTITIONER

## 2021-05-26 PROCEDURE — 99350 HOME/RES VST EST HIGH MDM 60: CPT | Mod: S$GLB,,, | Performed by: NURSE PRACTITIONER

## 2021-06-04 VITALS
RESPIRATION RATE: 16 BRPM | DIASTOLIC BLOOD PRESSURE: 68 MMHG | SYSTOLIC BLOOD PRESSURE: 110 MMHG | OXYGEN SATURATION: 98 % | TEMPERATURE: 99 F | HEART RATE: 68 BPM

## 2021-06-04 PROBLEM — R07.81 RIB PAIN: Status: ACTIVE | Noted: 2021-06-04

## 2021-06-04 PROBLEM — Z91.81 HISTORY OF RECENT FALL: Status: ACTIVE | Noted: 2021-06-04

## 2021-06-04 PROBLEM — I87.2 EDEMA OF BOTH LOWER LEGS DUE TO PERIPHERAL VENOUS INSUFFICIENCY: Status: RESOLVED | Noted: 2020-04-25 | Resolved: 2021-06-04

## 2021-06-04 PROBLEM — L97.921: Status: RESOLVED | Noted: 2019-12-30 | Resolved: 2021-06-04

## 2021-06-04 PROBLEM — R60.0 EDEMA OF BOTH LOWER LEGS DUE TO PERIPHERAL VENOUS INSUFFICIENCY: Status: RESOLVED | Noted: 2020-04-25 | Resolved: 2021-06-04

## 2021-07-07 ENCOUNTER — PATIENT MESSAGE (OUTPATIENT)
Dept: ADMINISTRATIVE | Facility: HOSPITAL | Age: 86
End: 2021-07-07

## 2021-07-08 ENCOUNTER — CARE AT HOME (OUTPATIENT)
Dept: HOME HEALTH SERVICES | Facility: CLINIC | Age: 86
End: 2021-07-08
Payer: MEDICARE

## 2021-07-08 DIAGNOSIS — M79.2 NEUROPATHIC PAIN: Primary | ICD-10-CM

## 2021-07-08 DIAGNOSIS — G47.00 INSOMNIA, UNSPECIFIED TYPE: ICD-10-CM

## 2021-07-08 DIAGNOSIS — C44.329 SQUAMOUS CELL CARCINOMA OF CHEEK: ICD-10-CM

## 2021-07-08 DIAGNOSIS — Z51.5 ENCOUNTER FOR PALLIATIVE CARE: ICD-10-CM

## 2021-07-08 PROCEDURE — 1159F PR MEDICATION LIST DOCUMENTED IN MEDICAL RECORD: ICD-10-PCS | Mod: S$GLB,,, | Performed by: NURSE PRACTITIONER

## 2021-07-08 PROCEDURE — 1159F MED LIST DOCD IN RCRD: CPT | Mod: S$GLB,,, | Performed by: NURSE PRACTITIONER

## 2021-07-08 PROCEDURE — 99350 HOME/RES VST EST HIGH MDM 60: CPT | Mod: S$GLB,,, | Performed by: NURSE PRACTITIONER

## 2021-07-08 PROCEDURE — 99350 PR HOME VISIT,ESTAB PATIENT,LEVEL IV: ICD-10-PCS | Mod: S$GLB,,, | Performed by: NURSE PRACTITIONER

## 2021-07-11 VITALS
HEIGHT: 61 IN | SYSTOLIC BLOOD PRESSURE: 130 MMHG | TEMPERATURE: 98 F | BODY MASS INDEX: 20.77 KG/M2 | DIASTOLIC BLOOD PRESSURE: 70 MMHG | RESPIRATION RATE: 16 BRPM | HEART RATE: 67 BPM | OXYGEN SATURATION: 98 % | WEIGHT: 110 LBS

## 2021-07-19 ENCOUNTER — OFFICE VISIT (OUTPATIENT)
Dept: FAMILY MEDICINE | Facility: CLINIC | Age: 86
End: 2021-07-19
Payer: MEDICARE

## 2021-07-19 VITALS
TEMPERATURE: 99 F | HEART RATE: 75 BPM | DIASTOLIC BLOOD PRESSURE: 66 MMHG | WEIGHT: 110.69 LBS | SYSTOLIC BLOOD PRESSURE: 120 MMHG | OXYGEN SATURATION: 97 % | RESPIRATION RATE: 16 BRPM | HEIGHT: 63 IN | BODY MASS INDEX: 19.61 KG/M2

## 2021-07-19 DIAGNOSIS — C79.31 BRAIN METASTASES: Primary | ICD-10-CM

## 2021-07-19 DIAGNOSIS — M79.2 NEUROPATHIC PAIN: ICD-10-CM

## 2021-07-19 DIAGNOSIS — Z23 NEED FOR VACCINATION: ICD-10-CM

## 2021-07-19 DIAGNOSIS — Z12.31 ENCOUNTER FOR SCREENING MAMMOGRAM FOR MALIGNANT NEOPLASM OF BREAST: ICD-10-CM

## 2021-07-19 PROCEDURE — G0009 ADMIN PNEUMOCOCCAL VACCINE: HCPCS | Mod: S$GLB,,, | Performed by: INTERNAL MEDICINE

## 2021-07-19 PROCEDURE — 1101F PT FALLS ASSESS-DOCD LE1/YR: CPT | Mod: CPTII,S$GLB,, | Performed by: INTERNAL MEDICINE

## 2021-07-19 PROCEDURE — 90732 PPSV23 VACC 2 YRS+ SUBQ/IM: CPT | Mod: S$GLB,,, | Performed by: INTERNAL MEDICINE

## 2021-07-19 PROCEDURE — 3288F PR FALLS RISK ASSESSMENT DOCUMENTED: ICD-10-PCS | Mod: CPTII,S$GLB,, | Performed by: INTERNAL MEDICINE

## 2021-07-19 PROCEDURE — 99214 OFFICE O/P EST MOD 30 MIN: CPT | Mod: 25,S$GLB,, | Performed by: INTERNAL MEDICINE

## 2021-07-19 PROCEDURE — 3288F FALL RISK ASSESSMENT DOCD: CPT | Mod: CPTII,S$GLB,, | Performed by: INTERNAL MEDICINE

## 2021-07-19 PROCEDURE — 90732 PNEUMOCOCCAL POLYSACCHARIDE VACCINE 23-VALENT =>2YO SQ IM: ICD-10-PCS | Mod: S$GLB,,, | Performed by: INTERNAL MEDICINE

## 2021-07-19 PROCEDURE — 1126F PR PAIN SEVERITY QUANTIFIED, NO PAIN PRESENT: ICD-10-PCS | Mod: CPTII,S$GLB,, | Performed by: INTERNAL MEDICINE

## 2021-07-19 PROCEDURE — 1126F AMNT PAIN NOTED NONE PRSNT: CPT | Mod: CPTII,S$GLB,, | Performed by: INTERNAL MEDICINE

## 2021-07-19 PROCEDURE — 1101F PR PT FALLS ASSESS DOC 0-1 FALLS W/OUT INJ PAST YR: ICD-10-PCS | Mod: CPTII,S$GLB,, | Performed by: INTERNAL MEDICINE

## 2021-07-19 PROCEDURE — G0009 PNEUMOCOCCAL POLYSACCHARIDE VACCINE 23-VALENT =>2YO SQ IM: ICD-10-PCS | Mod: S$GLB,,, | Performed by: INTERNAL MEDICINE

## 2021-07-19 PROCEDURE — 99214 PR OFFICE/OUTPT VISIT, EST, LEVL IV, 30-39 MIN: ICD-10-PCS | Mod: 25,S$GLB,, | Performed by: INTERNAL MEDICINE

## 2021-07-19 PROCEDURE — 1159F PR MEDICATION LIST DOCUMENTED IN MEDICAL RECORD: ICD-10-PCS | Mod: CPTII,S$GLB,, | Performed by: INTERNAL MEDICINE

## 2021-07-19 PROCEDURE — 99999 PR PBB SHADOW E&M-EST. PATIENT-LVL V: CPT | Mod: PBBFAC,,, | Performed by: INTERNAL MEDICINE

## 2021-07-19 PROCEDURE — 1159F MED LIST DOCD IN RCRD: CPT | Mod: CPTII,S$GLB,, | Performed by: INTERNAL MEDICINE

## 2021-07-19 PROCEDURE — 99999 PR PBB SHADOW E&M-EST. PATIENT-LVL V: ICD-10-PCS | Mod: PBBFAC,,, | Performed by: INTERNAL MEDICINE

## 2021-07-19 RX ORDER — GABAPENTIN 100 MG/1
200 CAPSULE ORAL NIGHTLY
Qty: 180 CAPSULE | Refills: 1 | Status: SHIPPED | OUTPATIENT
Start: 2021-07-19 | End: 2021-10-11

## 2021-07-19 RX ORDER — GABAPENTIN 100 MG/1
200 CAPSULE ORAL NIGHTLY
Qty: 180 CAPSULE | Refills: 1 | Status: SHIPPED | OUTPATIENT
Start: 2021-07-19 | End: 2021-07-19 | Stop reason: SDUPTHER

## 2021-09-09 ENCOUNTER — CARE AT HOME (OUTPATIENT)
Dept: HOME HEALTH SERVICES | Facility: CLINIC | Age: 86
End: 2021-09-09
Payer: MEDICARE

## 2021-09-09 DIAGNOSIS — C44.329 SQUAMOUS CELL CARCINOMA OF CHEEK: ICD-10-CM

## 2021-09-09 DIAGNOSIS — C79.31 BRAIN METASTASES: ICD-10-CM

## 2021-09-09 DIAGNOSIS — M79.2 NEUROPATHIC PAIN: Primary | ICD-10-CM

## 2021-09-09 PROCEDURE — 99443 PR PHYSICIAN TELEPHONE EVALUATION 21-30 MIN: ICD-10-PCS | Mod: 95,,, | Performed by: NURSE PRACTITIONER

## 2021-09-09 PROCEDURE — 99443 PR PHYSICIAN TELEPHONE EVALUATION 21-30 MIN: CPT | Mod: 95,,, | Performed by: NURSE PRACTITIONER

## 2021-10-11 DIAGNOSIS — M79.2 NEUROPATHIC PAIN: ICD-10-CM

## 2021-10-11 RX ORDER — GABAPENTIN 100 MG/1
CAPSULE ORAL
Qty: 180 CAPSULE | Refills: 1 | Status: SHIPPED | OUTPATIENT
Start: 2021-10-11 | End: 2022-01-05 | Stop reason: SDUPTHER

## 2021-10-27 ENCOUNTER — PES CALL (OUTPATIENT)
Dept: ADMINISTRATIVE | Facility: CLINIC | Age: 86
End: 2021-10-27
Payer: MEDICARE

## 2021-11-13 ENCOUNTER — PATIENT OUTREACH (OUTPATIENT)
Dept: ADMINISTRATIVE | Facility: OTHER | Age: 86
End: 2021-11-13
Payer: MEDICARE

## 2021-11-15 ENCOUNTER — OFFICE VISIT (OUTPATIENT)
Dept: DERMATOLOGY | Facility: CLINIC | Age: 86
End: 2021-11-15
Payer: MEDICARE

## 2021-11-15 VITALS — BODY MASS INDEX: 19.61 KG/M2 | WEIGHT: 110.69 LBS | RESPIRATION RATE: 18 BRPM | HEIGHT: 63 IN

## 2021-11-15 DIAGNOSIS — L57.0 ACTINIC KERATOSIS: ICD-10-CM

## 2021-11-15 DIAGNOSIS — D48.5 NEOPLASM OF UNCERTAIN BEHAVIOR OF SKIN: Primary | ICD-10-CM

## 2021-11-15 PROCEDURE — 1159F PR MEDICATION LIST DOCUMENTED IN MEDICAL RECORD: ICD-10-PCS | Mod: CPTII,S$GLB,, | Performed by: STUDENT IN AN ORGANIZED HEALTH CARE EDUCATION/TRAINING PROGRAM

## 2021-11-15 PROCEDURE — 88305 TISSUE EXAM BY PATHOLOGIST: CPT | Mod: 26,,, | Performed by: PATHOLOGY

## 2021-11-15 PROCEDURE — 88305 TISSUE EXAM BY PATHOLOGIST: ICD-10-PCS | Mod: 26,,, | Performed by: PATHOLOGY

## 2021-11-15 PROCEDURE — 99999 PR PBB SHADOW E&M-EST. PATIENT-LVL III: ICD-10-PCS | Mod: PBBFAC,,, | Performed by: STUDENT IN AN ORGANIZED HEALTH CARE EDUCATION/TRAINING PROGRAM

## 2021-11-15 PROCEDURE — 1101F PT FALLS ASSESS-DOCD LE1/YR: CPT | Mod: CPTII,S$GLB,, | Performed by: STUDENT IN AN ORGANIZED HEALTH CARE EDUCATION/TRAINING PROGRAM

## 2021-11-15 PROCEDURE — 1160F PR REVIEW ALL MEDS BY PRESCRIBER/CLIN PHARMACIST DOCUMENTED: ICD-10-PCS | Mod: CPTII,S$GLB,, | Performed by: STUDENT IN AN ORGANIZED HEALTH CARE EDUCATION/TRAINING PROGRAM

## 2021-11-15 PROCEDURE — 99213 PR OFFICE/OUTPT VISIT, EST, LEVL III, 20-29 MIN: ICD-10-PCS | Mod: 25,S$GLB,, | Performed by: STUDENT IN AN ORGANIZED HEALTH CARE EDUCATION/TRAINING PROGRAM

## 2021-11-15 PROCEDURE — 17003 DESTRUCTION, PREMALIGNANT LESIONS; SECOND THROUGH 14 LESIONS: ICD-10-PCS | Mod: S$GLB,,, | Performed by: STUDENT IN AN ORGANIZED HEALTH CARE EDUCATION/TRAINING PROGRAM

## 2021-11-15 PROCEDURE — 1159F MED LIST DOCD IN RCRD: CPT | Mod: CPTII,S$GLB,, | Performed by: STUDENT IN AN ORGANIZED HEALTH CARE EDUCATION/TRAINING PROGRAM

## 2021-11-15 PROCEDURE — 3288F FALL RISK ASSESSMENT DOCD: CPT | Mod: CPTII,S$GLB,, | Performed by: STUDENT IN AN ORGANIZED HEALTH CARE EDUCATION/TRAINING PROGRAM

## 2021-11-15 PROCEDURE — 99999 PR PBB SHADOW E&M-EST. PATIENT-LVL III: CPT | Mod: PBBFAC,,, | Performed by: STUDENT IN AN ORGANIZED HEALTH CARE EDUCATION/TRAINING PROGRAM

## 2021-11-15 PROCEDURE — 88305 TISSUE EXAM BY PATHOLOGIST: CPT | Performed by: PATHOLOGY

## 2021-11-15 PROCEDURE — 99213 OFFICE O/P EST LOW 20 MIN: CPT | Mod: 25,S$GLB,, | Performed by: STUDENT IN AN ORGANIZED HEALTH CARE EDUCATION/TRAINING PROGRAM

## 2021-11-15 PROCEDURE — 1160F RVW MEDS BY RX/DR IN RCRD: CPT | Mod: CPTII,S$GLB,, | Performed by: STUDENT IN AN ORGANIZED HEALTH CARE EDUCATION/TRAINING PROGRAM

## 2021-11-15 PROCEDURE — 17000 PR DESTRUCTION(LASER SURGERY,CRYOSURGERY,CHEMOSURGERY),PREMALIGNANT LESIONS,FIRST LESION: ICD-10-PCS | Mod: 59,S$GLB,, | Performed by: STUDENT IN AN ORGANIZED HEALTH CARE EDUCATION/TRAINING PROGRAM

## 2021-11-15 PROCEDURE — 3288F PR FALLS RISK ASSESSMENT DOCUMENTED: ICD-10-PCS | Mod: CPTII,S$GLB,, | Performed by: STUDENT IN AN ORGANIZED HEALTH CARE EDUCATION/TRAINING PROGRAM

## 2021-11-15 PROCEDURE — 17003 DESTRUCT PREMALG LES 2-14: CPT | Mod: S$GLB,,, | Performed by: STUDENT IN AN ORGANIZED HEALTH CARE EDUCATION/TRAINING PROGRAM

## 2021-11-15 PROCEDURE — 11102 PR TANGENTIAL BIOPSY, SKIN, SINGLE LESION: ICD-10-PCS | Mod: S$GLB,,, | Performed by: STUDENT IN AN ORGANIZED HEALTH CARE EDUCATION/TRAINING PROGRAM

## 2021-11-15 PROCEDURE — 11102 TANGNTL BX SKIN SINGLE LES: CPT | Mod: S$GLB,,, | Performed by: STUDENT IN AN ORGANIZED HEALTH CARE EDUCATION/TRAINING PROGRAM

## 2021-11-15 PROCEDURE — 1101F PR PT FALLS ASSESS DOC 0-1 FALLS W/OUT INJ PAST YR: ICD-10-PCS | Mod: CPTII,S$GLB,, | Performed by: STUDENT IN AN ORGANIZED HEALTH CARE EDUCATION/TRAINING PROGRAM

## 2021-11-15 PROCEDURE — 17000 DESTRUCT PREMALG LESION: CPT | Mod: 59,S$GLB,, | Performed by: STUDENT IN AN ORGANIZED HEALTH CARE EDUCATION/TRAINING PROGRAM

## 2021-11-17 LAB
FINAL PATHOLOGIC DIAGNOSIS: NORMAL
GROSS: NORMAL
Lab: NORMAL
MICROSCOPIC EXAM: NORMAL

## 2021-11-18 ENCOUNTER — TELEPHONE (OUTPATIENT)
Dept: DERMATOLOGY | Facility: CLINIC | Age: 86
End: 2021-11-18
Payer: MEDICARE

## 2021-11-18 ENCOUNTER — PATIENT MESSAGE (OUTPATIENT)
Dept: DERMATOLOGY | Facility: CLINIC | Age: 86
End: 2021-11-18
Payer: MEDICARE

## 2021-11-29 ENCOUNTER — OFFICE VISIT (OUTPATIENT)
Dept: DERMATOLOGY | Facility: CLINIC | Age: 86
End: 2021-11-29
Payer: MEDICARE

## 2021-11-29 DIAGNOSIS — D09.9 SQUAMOUS CELL CARCINOMA IN SITU (SCCIS): Primary | ICD-10-CM

## 2021-11-29 PROCEDURE — 99999 PR PBB SHADOW E&M-EST. PATIENT-LVL II: ICD-10-PCS | Mod: PBBFAC,,, | Performed by: STUDENT IN AN ORGANIZED HEALTH CARE EDUCATION/TRAINING PROGRAM

## 2021-11-29 PROCEDURE — 99214 PR OFFICE/OUTPT VISIT, EST, LEVL IV, 30-39 MIN: ICD-10-PCS | Mod: S$GLB,,, | Performed by: STUDENT IN AN ORGANIZED HEALTH CARE EDUCATION/TRAINING PROGRAM

## 2021-11-29 PROCEDURE — 99999 PR PBB SHADOW E&M-EST. PATIENT-LVL II: CPT | Mod: PBBFAC,,, | Performed by: STUDENT IN AN ORGANIZED HEALTH CARE EDUCATION/TRAINING PROGRAM

## 2021-11-29 PROCEDURE — 99214 OFFICE O/P EST MOD 30 MIN: CPT | Mod: S$GLB,,, | Performed by: STUDENT IN AN ORGANIZED HEALTH CARE EDUCATION/TRAINING PROGRAM

## 2021-11-29 RX ORDER — IMIQUIMOD 12.5 MG/.25G
CREAM TOPICAL
Qty: 12 PACKET | Refills: 2 | Status: SHIPPED | OUTPATIENT
Start: 2021-11-29

## 2022-01-05 ENCOUNTER — PATIENT MESSAGE (OUTPATIENT)
Dept: FAMILY MEDICINE | Facility: CLINIC | Age: 87
End: 2022-01-05
Payer: MEDICARE

## 2022-01-05 DIAGNOSIS — M79.2 NEUROPATHIC PAIN: ICD-10-CM

## 2022-01-05 RX ORDER — GABAPENTIN 100 MG/1
200 CAPSULE ORAL NIGHTLY
Qty: 180 CAPSULE | Refills: 1 | Status: SHIPPED | OUTPATIENT
Start: 2022-01-05

## 2022-01-05 NOTE — TELEPHONE ENCOUNTER
No new care gaps identified.  Powered by [employer]. Reference number: [id number].   1/05/2022 12:31:02 PM CST

## 2022-01-19 ENCOUNTER — PATIENT OUTREACH (OUTPATIENT)
Dept: ADMINISTRATIVE | Facility: OTHER | Age: 87
End: 2022-01-19
Payer: MEDICARE

## 2022-01-21 ENCOUNTER — OFFICE VISIT (OUTPATIENT)
Dept: DERMATOLOGY | Facility: CLINIC | Age: 87
End: 2022-01-21
Payer: MEDICARE

## 2022-01-21 VITALS — WEIGHT: 110.69 LBS | RESPIRATION RATE: 18 BRPM | BODY MASS INDEX: 19.61 KG/M2 | HEIGHT: 63 IN

## 2022-01-21 DIAGNOSIS — D09.9 SQUAMOUS CELL CARCINOMA IN SITU (SCCIS): Primary | ICD-10-CM

## 2022-01-21 DIAGNOSIS — C44.91 SUPERFICIAL BASAL CELL CARCINOMA: ICD-10-CM

## 2022-01-21 PROCEDURE — 1159F MED LIST DOCD IN RCRD: CPT | Mod: CPTII,S$GLB,, | Performed by: STUDENT IN AN ORGANIZED HEALTH CARE EDUCATION/TRAINING PROGRAM

## 2022-01-21 PROCEDURE — 3288F PR FALLS RISK ASSESSMENT DOCUMENTED: ICD-10-PCS | Mod: CPTII,S$GLB,, | Performed by: STUDENT IN AN ORGANIZED HEALTH CARE EDUCATION/TRAINING PROGRAM

## 2022-01-21 PROCEDURE — 99999 PR PBB SHADOW E&M-EST. PATIENT-LVL III: ICD-10-PCS | Mod: PBBFAC,,, | Performed by: STUDENT IN AN ORGANIZED HEALTH CARE EDUCATION/TRAINING PROGRAM

## 2022-01-21 PROCEDURE — 1160F RVW MEDS BY RX/DR IN RCRD: CPT | Mod: CPTII,S$GLB,, | Performed by: STUDENT IN AN ORGANIZED HEALTH CARE EDUCATION/TRAINING PROGRAM

## 2022-01-21 PROCEDURE — 3288F FALL RISK ASSESSMENT DOCD: CPT | Mod: CPTII,S$GLB,, | Performed by: STUDENT IN AN ORGANIZED HEALTH CARE EDUCATION/TRAINING PROGRAM

## 2022-01-21 PROCEDURE — 1159F PR MEDICATION LIST DOCUMENTED IN MEDICAL RECORD: ICD-10-PCS | Mod: CPTII,S$GLB,, | Performed by: STUDENT IN AN ORGANIZED HEALTH CARE EDUCATION/TRAINING PROGRAM

## 2022-01-21 PROCEDURE — 1101F PR PT FALLS ASSESS DOC 0-1 FALLS W/OUT INJ PAST YR: ICD-10-PCS | Mod: CPTII,S$GLB,, | Performed by: STUDENT IN AN ORGANIZED HEALTH CARE EDUCATION/TRAINING PROGRAM

## 2022-01-21 PROCEDURE — 1126F AMNT PAIN NOTED NONE PRSNT: CPT | Mod: CPTII,S$GLB,, | Performed by: STUDENT IN AN ORGANIZED HEALTH CARE EDUCATION/TRAINING PROGRAM

## 2022-01-21 PROCEDURE — 1126F PR PAIN SEVERITY QUANTIFIED, NO PAIN PRESENT: ICD-10-PCS | Mod: CPTII,S$GLB,, | Performed by: STUDENT IN AN ORGANIZED HEALTH CARE EDUCATION/TRAINING PROGRAM

## 2022-01-21 PROCEDURE — 99214 PR OFFICE/OUTPT VISIT, EST, LEVL IV, 30-39 MIN: ICD-10-PCS | Mod: S$GLB,,, | Performed by: STUDENT IN AN ORGANIZED HEALTH CARE EDUCATION/TRAINING PROGRAM

## 2022-01-21 PROCEDURE — 1160F PR REVIEW ALL MEDS BY PRESCRIBER/CLIN PHARMACIST DOCUMENTED: ICD-10-PCS | Mod: CPTII,S$GLB,, | Performed by: STUDENT IN AN ORGANIZED HEALTH CARE EDUCATION/TRAINING PROGRAM

## 2022-01-21 PROCEDURE — 99999 PR PBB SHADOW E&M-EST. PATIENT-LVL III: CPT | Mod: PBBFAC,,, | Performed by: STUDENT IN AN ORGANIZED HEALTH CARE EDUCATION/TRAINING PROGRAM

## 2022-01-21 PROCEDURE — 99214 OFFICE O/P EST MOD 30 MIN: CPT | Mod: S$GLB,,, | Performed by: STUDENT IN AN ORGANIZED HEALTH CARE EDUCATION/TRAINING PROGRAM

## 2022-01-21 PROCEDURE — 1101F PT FALLS ASSESS-DOCD LE1/YR: CPT | Mod: CPTII,S$GLB,, | Performed by: STUDENT IN AN ORGANIZED HEALTH CARE EDUCATION/TRAINING PROGRAM

## 2022-01-21 NOTE — PROGRESS NOTES
Subjective:       Patient ID:  Summer Ch is a 89 y.o. female who presents for   Chief Complaint   Patient presents with    Follow-up     Patient present for follow up use of aldara 5x weekly x6wks for SCCIs on L zygoma and superficial BCC on chest, last seen on 11/26/21. Pt notes that she has been using the cream as rxed on the zygoma, but not on the chest. Also using it on a spot on the R lateral brow because she thought it was cancerous as well. The spot on the L zygoma looks better. She notes the spot on the R lateral brow is a little tender.    yes Phx of NMSC.  Derm Hx: multiple BCCs and SCCs removed from her face, Dec 2019  no Fhx of melanoma.    Past Medical History:  No date: Cancer      Comment:  skin cancer      Review of Systems   Constitutional: Negative for fever and chills.   HENT: Negative for sore throat.    Respiratory: Negative for cough.    Gastrointestinal: Negative for nausea and vomiting.   Skin: Positive for dry skin. Negative for itching and rash.        Objective:    Physical Exam   Constitutional: She appears well-developed and well-nourished.   Neurological: She is alert and oriented to person, place, and time.   Psychiatric: She has a normal mood and affect.   Skin:   Areas Examined (abnormalities noted in diagram):   Scalp / Hair Palpated and Inspected  Head / Face Inspection Performed  Neck Inspection Performed                   Diagram Legend     Erythematous scaling macule/papule c/w actinic keratosis       Vascular papule c/w angioma      Pigmented verrucoid papule/plaque c/w seborrheic keratosis      Yellow umbilicated papule c/w sebaceous hyperplasia      Irregularly shaped tan macule c/w lentigo     1-2 mm smooth white papules consistent with Milia      Movable subcutaneous cyst with punctum c/w epidermal inclusion cyst      Subcutaneous movable cyst c/w pilar cyst      Firm pink to brown papule c/w dermatofibroma      Pedunculated fleshy papule(s) c/w skin tag(s)       Evenly pigmented macule c/w junctional nevus     Mildly variegated pigmented, slightly irregular-bordered macule c/w mildly atypical nevus      Flesh colored to evenly pigmented papule c/w intradermal nevus       Pink pearly papule/plaque c/w basal cell carcinoma      Erythematous hyperkeratotic cursted plaque c/w SCC      Surgical scar with no sign of skin cancer recurrence      Open and closed comedones      Inflammatory papules and pustules      Verrucoid papule consistent consistent with wart     Erythematous eczematous patches and plaques     Dystrophic onycholytic nail with subungual debris c/w onychomycosis     Umbilicated papule    Erythematous-base heme-crusted tan verrucoid plaque consistent with inflamed seborrheic keratosis     Erythematous Silvery Scaling Plaque c/w Psoriasis     See annotation      Assessment / Plan:        Squamous cell carcinoma in situ (SCCIS)  L zygoma, s/p Aldara.  - Pt instructed to stop Aldara and let heal, will eval at f/u 2mo  - There is a possible SK vs SCC on R lateral brow, however due to use of Aldara x6wks, lesion is inflamed today. Will eval at 2mo, with possible biopsy to r/o SCC    Superficial basal cell carcinoma  Central chest, pt has not been applying Aldara  - Discussed that this is a skin cancer and should be treated with Aldara 5x weekly x6wks. Pt agrees to trial Aldara to site  Discussed using only a small amount and ok to reuse packet. Do not cover medicine with bandaid. Explained that area may get inflamed, red, crusty or painful with use of cream. Pt expresses understanding. Needs f/u with me in 2 months to recheck site             Follow up in about 2 months (around 3/21/2022).

## 2022-02-05 ENCOUNTER — PATIENT MESSAGE (OUTPATIENT)
Dept: FAMILY MEDICINE | Facility: CLINIC | Age: 87
End: 2022-02-05
Payer: MEDICARE

## 2022-02-08 ENCOUNTER — LAB VISIT (OUTPATIENT)
Dept: LAB | Facility: OTHER | Age: 87
End: 2022-02-08
Payer: MEDICARE

## 2022-02-08 DIAGNOSIS — Z20.822 ENCOUNTER FOR LABORATORY TESTING FOR COVID-19 VIRUS: ICD-10-CM

## 2022-02-08 PROCEDURE — U0003 INFECTIOUS AGENT DETECTION BY NUCLEIC ACID (DNA OR RNA); SEVERE ACUTE RESPIRATORY SYNDROME CORONAVIRUS 2 (SARS-COV-2) (CORONAVIRUS DISEASE [COVID-19]), AMPLIFIED PROBE TECHNIQUE, MAKING USE OF HIGH THROUGHPUT TECHNOLOGIES AS DESCRIBED BY CMS-2020-01-R: HCPCS | Performed by: EMERGENCY MEDICINE

## 2022-02-09 ENCOUNTER — PATIENT MESSAGE (OUTPATIENT)
Dept: DERMATOLOGY | Facility: CLINIC | Age: 87
End: 2022-02-09
Payer: MEDICARE

## 2022-02-09 LAB
SARS-COV-2 RNA RESP QL NAA+PROBE: NOT DETECTED
SARS-COV-2- CYCLE NUMBER: NORMAL

## 2022-02-12 ENCOUNTER — PATIENT MESSAGE (OUTPATIENT)
Dept: FAMILY MEDICINE | Facility: CLINIC | Age: 87
End: 2022-02-12
Payer: MEDICARE

## 2022-02-15 NOTE — TELEPHONE ENCOUNTER
I am not able to respond to portal message, since patient marked as  ranjit was disabled.     Please call her daughter Amber and thank her for letting us know, please express our condolences.

## 2022-02-16 NOTE — TELEPHONE ENCOUNTER
Attempted to contact pt daughter. No answer. Left message expressing our condolences for their loss .